# Patient Record
Sex: FEMALE | Race: WHITE | NOT HISPANIC OR LATINO | Employment: OTHER | URBAN - NONMETROPOLITAN AREA
[De-identification: names, ages, dates, MRNs, and addresses within clinical notes are randomized per-mention and may not be internally consistent; named-entity substitution may affect disease eponyms.]

---

## 2023-02-13 PROBLEM — G47.00 INSOMNIA: Status: ACTIVE | Noted: 2023-02-13

## 2023-02-13 PROBLEM — E83.42 HYPOMAGNESEMIA: Status: ACTIVE | Noted: 2023-02-13

## 2023-02-13 PROBLEM — E55.9 VITAMIN D DEFICIENCY: Status: ACTIVE | Noted: 2023-02-13

## 2023-02-13 PROBLEM — E11.40 DIABETIC NEUROPATHY (MULTI): Status: ACTIVE | Noted: 2023-02-13

## 2023-02-13 PROBLEM — R26.89 BALANCE DISORDER: Status: ACTIVE | Noted: 2023-02-13

## 2023-02-13 PROBLEM — R07.9 CHEST PAIN: Status: ACTIVE | Noted: 2023-02-13

## 2023-02-13 PROBLEM — M54.50 LOW BACK PAIN: Status: ACTIVE | Noted: 2023-02-13

## 2023-02-13 PROBLEM — H25.9 AGE-RELATED CATARACT OF BOTH EYES: Status: ACTIVE | Noted: 2023-02-13

## 2023-02-13 PROBLEM — B35.1 ONYCHOMYCOSIS: Status: ACTIVE | Noted: 2023-02-13

## 2023-02-13 PROBLEM — M17.9 OSTEOARTHRITIS OF KNEE: Status: ACTIVE | Noted: 2023-02-13

## 2023-02-13 PROBLEM — E11.43 DIABETIC AUTONOMIC NEUROPATHY ASSOCIATED WITH TYPE 2 DIABETES MELLITUS (MULTI): Status: ACTIVE | Noted: 2023-02-13

## 2023-02-13 PROBLEM — H81.10 BENIGN PAROXYSMAL POSITIONAL VERTIGO: Status: ACTIVE | Noted: 2023-02-13

## 2023-02-13 PROBLEM — S39.012A STRAIN OF LUMBAR REGION: Status: ACTIVE | Noted: 2023-02-13

## 2023-02-13 PROBLEM — I83.93 VARICOSE VEINS OF LEGS: Status: ACTIVE | Noted: 2023-02-13

## 2023-02-13 PROBLEM — K21.9 GERD (GASTROESOPHAGEAL REFLUX DISEASE): Status: ACTIVE | Noted: 2023-02-13

## 2023-02-13 PROBLEM — J06.9 UPPER RESPIRATORY INFECTION, ACUTE: Status: ACTIVE | Noted: 2023-02-13

## 2023-02-13 PROBLEM — E11.9 DIABETES MELLITUS, TYPE 2 (MULTI): Status: ACTIVE | Noted: 2023-02-13

## 2023-02-13 PROBLEM — I10 HTN (HYPERTENSION): Status: ACTIVE | Noted: 2023-02-13

## 2023-02-13 PROBLEM — N18.31 STAGE 3A CHRONIC KIDNEY DISEASE (MULTI): Status: ACTIVE | Noted: 2023-02-13

## 2023-02-13 PROBLEM — K59.00 CONSTIPATION: Status: ACTIVE | Noted: 2023-02-13

## 2023-02-13 PROBLEM — E11.21 CONTROLLED TYPE 2 DIABETES MELLITUS WITH DIABETIC NEPHROPATHY, WITHOUT LONG-TERM CURRENT USE OF INSULIN (MULTI): Status: ACTIVE | Noted: 2023-02-13

## 2023-02-13 PROBLEM — U07.1 COVID-19 VIRUS INFECTION: Status: ACTIVE | Noted: 2023-02-13

## 2023-02-13 PROBLEM — I48.91 ATRIAL FIBRILLATION (MULTI): Status: ACTIVE | Noted: 2023-02-13

## 2023-02-13 PROBLEM — E78.00 HYPERCHOLESTEROLEMIA: Status: ACTIVE | Noted: 2023-02-13

## 2023-02-13 PROBLEM — J45.909 ASTHMA (HHS-HCC): Status: ACTIVE | Noted: 2023-02-13

## 2023-02-13 RX ORDER — MONTELUKAST SODIUM 10 MG/1
10 TABLET ORAL NIGHTLY
COMMUNITY
Start: 2020-03-07 | End: 2023-07-14 | Stop reason: SDUPTHER

## 2023-02-13 RX ORDER — GENTAMICIN SULFATE 3 MG/ML
1 SOLUTION/ DROPS OPHTHALMIC 4 TIMES DAILY
COMMUNITY
Start: 2022-04-25

## 2023-02-13 RX ORDER — LISINOPRIL 20 MG/1
20 TABLET ORAL DAILY
COMMUNITY
Start: 2020-03-07 | End: 2023-11-06 | Stop reason: SDUPTHER

## 2023-02-13 RX ORDER — AMLODIPINE BESYLATE 10 MG/1
10 TABLET ORAL
COMMUNITY
Start: 2020-03-07 | End: 2023-11-06 | Stop reason: SDUPTHER

## 2023-02-13 RX ORDER — ALBUTEROL SULFATE 90 UG/1
2 AEROSOL, METERED RESPIRATORY (INHALATION) EVERY 6 HOURS PRN
COMMUNITY
Start: 2020-03-07 | End: 2024-05-06 | Stop reason: SDUPTHER

## 2023-02-13 RX ORDER — NITROGLYCERIN 0.4 MG/1
TABLET SUBLINGUAL
COMMUNITY
Start: 2022-09-13

## 2023-02-13 RX ORDER — CYANOCOBALAMIN (VITAMIN B-12) 500 MCG
1 TABLET ORAL DAILY
COMMUNITY
Start: 2021-09-02 | End: 2023-05-04 | Stop reason: ALTCHOICE

## 2023-02-13 RX ORDER — CALCIUM CARBONATE/VITAMIN D3 600MG-5MCG
1 TABLET ORAL 2 TIMES DAILY
COMMUNITY
Start: 2020-03-07 | End: 2023-05-04 | Stop reason: ALTCHOICE

## 2023-02-13 RX ORDER — HYDROGEN PEROXIDE 3 %
20 SOLUTION, NON-ORAL MISCELLANEOUS DAILY
COMMUNITY
Start: 2020-03-07

## 2023-02-13 RX ORDER — GABAPENTIN 600 MG/1
600 TABLET ORAL 3 TIMES DAILY
COMMUNITY
Start: 2021-09-10 | End: 2023-11-06 | Stop reason: SDUPTHER

## 2023-02-13 RX ORDER — METFORMIN HYDROCHLORIDE 500 MG/1
500 TABLET, EXTENDED RELEASE ORAL 2 TIMES DAILY
COMMUNITY
Start: 2022-09-13 | End: 2023-11-06 | Stop reason: SDUPTHER

## 2023-02-13 RX ORDER — AMOXICILLIN 250 MG
1 CAPSULE ORAL 2 TIMES DAILY
COMMUNITY
Start: 2021-06-11 | End: 2023-05-04 | Stop reason: ALTCHOICE

## 2023-02-13 RX ORDER — ONDANSETRON 4 MG/1
4 TABLET, ORALLY DISINTEGRATING ORAL EVERY 6 HOURS
COMMUNITY
Start: 2021-09-02 | End: 2023-05-04 | Stop reason: ALTCHOICE

## 2023-02-13 RX ORDER — LANOLIN ALCOHOL/MO/W.PET/CERES
1 CREAM (GRAM) TOPICAL DAILY
COMMUNITY
Start: 2020-03-07

## 2023-02-13 RX ORDER — METOPROLOL TARTRATE 75 MG/1
75 TABLET, FILM COATED ORAL 2 TIMES DAILY
COMMUNITY
Start: 2022-03-01 | End: 2023-07-14 | Stop reason: SDUPTHER

## 2023-02-13 RX ORDER — TALC
3 POWDER (GRAM) TOPICAL NIGHTLY
COMMUNITY
Start: 2020-03-07

## 2023-02-13 RX ORDER — SENNOSIDES/DOCUSATE SODIUM 8.6MG-50MG
TABLET ORAL
COMMUNITY
Start: 2020-03-07

## 2023-02-13 RX ORDER — PROPAFENONE HYDROCHLORIDE 225 MG/1
225 TABLET, COATED ORAL 2 TIMES DAILY
COMMUNITY
Start: 2020-08-18 | End: 2023-06-30 | Stop reason: ALTCHOICE

## 2023-02-13 RX ORDER — ATORVASTATIN CALCIUM 40 MG/1
40 TABLET, FILM COATED ORAL NIGHTLY
COMMUNITY
Start: 2021-09-02 | End: 2023-07-14 | Stop reason: SDUPTHER

## 2023-02-13 RX ORDER — DEXTROMETHORPHAN HYDROBROMIDE, GUAIFENESIN 5; 100 MG/5ML; MG/5ML
1300 LIQUID ORAL
COMMUNITY
Start: 2020-03-07 | End: 2023-05-04 | Stop reason: ALTCHOICE

## 2023-03-13 ENCOUNTER — TELEPHONE (OUTPATIENT)
Dept: PRIMARY CARE | Facility: CLINIC | Age: 78
End: 2023-03-13
Payer: MEDICARE

## 2023-03-13 DIAGNOSIS — J06.9 VIRAL UPPER RESPIRATORY TRACT INFECTION: ICD-10-CM

## 2023-03-13 DIAGNOSIS — J06.9 VIRAL UPPER RESPIRATORY TRACT INFECTION: Primary | ICD-10-CM

## 2023-03-13 DIAGNOSIS — J01.90 ACUTE SINUSITIS, RECURRENCE NOT SPECIFIED, UNSPECIFIED LOCATION: Primary | ICD-10-CM

## 2023-03-13 RX ORDER — AZITHROMYCIN 250 MG/1
TABLET, FILM COATED ORAL
Qty: 6 TABLET | Refills: 0 | Status: SHIPPED | OUTPATIENT
Start: 2023-03-13 | End: 2023-03-18

## 2023-03-13 RX ORDER — AZITHROMYCIN 250 MG/1
TABLET, FILM COATED ORAL
Qty: 6 TABLET | Refills: 0 | Status: SHIPPED | OUTPATIENT
Start: 2023-03-13 | End: 2023-03-13 | Stop reason: SDUPTHER

## 2023-03-13 NOTE — TELEPHONE ENCOUNTER
PC Needs a prescription for a zpack, has sore throat & cold sx for several day & has been exposed to strep throat. Please call if needed.  Pharmacy

## 2023-04-06 ENCOUNTER — APPOINTMENT (OUTPATIENT)
Dept: PRIMARY CARE | Facility: CLINIC | Age: 78
End: 2023-04-06
Payer: MEDICARE

## 2023-04-20 LAB
ALANINE AMINOTRANSFERASE (SGPT) (U/L) IN SER/PLAS: 10 U/L (ref 7–45)
ALBUMIN (G/DL) IN SER/PLAS: 4.3 G/DL (ref 3.4–5)
ALKALINE PHOSPHATASE (U/L) IN SER/PLAS: 77 U/L (ref 33–136)
ANION GAP IN SER/PLAS: 12 MMOL/L (ref 10–20)
ASPARTATE AMINOTRANSFERASE (SGOT) (U/L) IN SER/PLAS: 13 U/L (ref 9–39)
BASOPHILS (10*3/UL) IN BLOOD BY AUTOMATED COUNT: 0.09 X10E9/L (ref 0–0.1)
BASOPHILS/100 LEUKOCYTES IN BLOOD BY AUTOMATED COUNT: 1.1 % (ref 0–2)
BILIRUBIN TOTAL (MG/DL) IN SER/PLAS: 0.3 MG/DL (ref 0–1.2)
CALCIDIOL (25 OH VITAMIN D3) (NG/ML) IN SER/PLAS: 21 NG/ML
CALCIUM (MG/DL) IN SER/PLAS: 9.4 MG/DL (ref 8.6–10.3)
CARBON DIOXIDE, TOTAL (MMOL/L) IN SER/PLAS: 27 MMOL/L (ref 21–32)
CHLORIDE (MMOL/L) IN SER/PLAS: 104 MMOL/L (ref 98–107)
CHOLESTEROL (MG/DL) IN SER/PLAS: 155 MG/DL (ref 0–199)
CHOLESTEROL IN HDL (MG/DL) IN SER/PLAS: 46 MG/DL
CHOLESTEROL/HDL RATIO: 3.4
COBALAMIN (VITAMIN B12) (PG/ML) IN SER/PLAS: 252 PG/ML (ref 211–911)
CREATININE (MG/DL) IN SER/PLAS: 1.05 MG/DL (ref 0.5–1.05)
ERYTHROCYTE DISTRIBUTION WIDTH (RATIO) BY AUTOMATED COUNT: 13.9 % (ref 11.5–14.5)
ERYTHROCYTE MEAN CORPUSCULAR HEMOGLOBIN CONCENTRATION (G/DL) BY AUTOMATED: 29.8 G/DL (ref 32–36)
ERYTHROCYTE MEAN CORPUSCULAR VOLUME (FL) BY AUTOMATED COUNT: 81 FL (ref 80–100)
ERYTHROCYTES (10*6/UL) IN BLOOD BY AUTOMATED COUNT: 4.97 X10E12/L (ref 4–5.2)
ESTIMATED AVERAGE GLUCOSE FOR HBA1C: 171 MG/DL
GFR FEMALE: 54 ML/MIN/1.73M2
GLUCOSE (MG/DL) IN SER/PLAS: 147 MG/DL (ref 74–99)
HEMATOCRIT (%) IN BLOOD BY AUTOMATED COUNT: 40 % (ref 36–46)
HEMOGLOBIN (G/DL) IN BLOOD: 11.9 G/DL (ref 12–16)
HEMOGLOBIN A1C/HEMOGLOBIN TOTAL IN BLOOD: 7.6 %
IMMATURE GRANULOCYTES/100 LEUKOCYTES IN BLOOD BY AUTOMATED COUNT: 0.2 % (ref 0–0.9)
LDL: 81 MG/DL (ref 0–99)
LEUKOCYTES (10*3/UL) IN BLOOD BY AUTOMATED COUNT: 8.3 X10E9/L (ref 4.4–11.3)
LYMPHOCYTES (10*3/UL) IN BLOOD BY AUTOMATED COUNT: 2.97 X10E9/L (ref 0.8–3)
LYMPHOCYTES/100 LEUKOCYTES IN BLOOD BY AUTOMATED COUNT: 35.9 % (ref 13–44)
MAGNESIUM (MG/DL) IN SER/PLAS: 1.74 MG/DL (ref 1.6–2.4)
MONOCYTES (10*3/UL) IN BLOOD BY AUTOMATED COUNT: 0.68 X10E9/L (ref 0.05–0.8)
MONOCYTES/100 LEUKOCYTES IN BLOOD BY AUTOMATED COUNT: 8.2 % (ref 2–10)
NEUTROPHILS (10*3/UL) IN BLOOD BY AUTOMATED COUNT: 4.51 X10E9/L (ref 1.6–5.5)
NEUTROPHILS/100 LEUKOCYTES IN BLOOD BY AUTOMATED COUNT: 54.6 % (ref 40–80)
PLATELETS (10*3/UL) IN BLOOD AUTOMATED COUNT: 285 X10E9/L (ref 150–450)
POTASSIUM (MMOL/L) IN SER/PLAS: 4.2 MMOL/L (ref 3.5–5.3)
PROTEIN TOTAL: 7.4 G/DL (ref 6.4–8.2)
SODIUM (MMOL/L) IN SER/PLAS: 139 MMOL/L (ref 136–145)
TRIGLYCERIDE (MG/DL) IN SER/PLAS: 141 MG/DL (ref 0–149)
UREA NITROGEN (MG/DL) IN SER/PLAS: 16 MG/DL (ref 6–23)
VLDL: 28 MG/DL (ref 0–40)

## 2023-05-04 ENCOUNTER — OFFICE VISIT (OUTPATIENT)
Dept: PRIMARY CARE | Facility: CLINIC | Age: 78
End: 2023-05-04
Payer: MEDICARE

## 2023-05-04 VITALS
OXYGEN SATURATION: 94 % | BODY MASS INDEX: 31.34 KG/M2 | WEIGHT: 166 LBS | DIASTOLIC BLOOD PRESSURE: 74 MMHG | HEIGHT: 61 IN | SYSTOLIC BLOOD PRESSURE: 128 MMHG | HEART RATE: 90 BPM

## 2023-05-04 DIAGNOSIS — F51.04 PSYCHOPHYSIOLOGICAL INSOMNIA: ICD-10-CM

## 2023-05-04 DIAGNOSIS — E55.9 VITAMIN D DEFICIENCY: ICD-10-CM

## 2023-05-04 DIAGNOSIS — M17.12 PRIMARY OSTEOARTHRITIS OF LEFT KNEE: ICD-10-CM

## 2023-05-04 DIAGNOSIS — B35.1 ONYCHOMYCOSIS: ICD-10-CM

## 2023-05-04 DIAGNOSIS — J20.9 ACUTE BRONCHITIS, UNSPECIFIED ORGANISM: ICD-10-CM

## 2023-05-04 DIAGNOSIS — E83.42 HYPOMAGNESEMIA: ICD-10-CM

## 2023-05-04 DIAGNOSIS — E78.00 HYPERCHOLESTEROLEMIA: ICD-10-CM

## 2023-05-04 DIAGNOSIS — E11.21 CONTROLLED TYPE 2 DIABETES MELLITUS WITH DIABETIC NEPHROPATHY, WITHOUT LONG-TERM CURRENT USE OF INSULIN (MULTI): ICD-10-CM

## 2023-05-04 DIAGNOSIS — H25.9 AGE-RELATED CATARACT OF BOTH EYES, UNSPECIFIED AGE-RELATED CATARACT TYPE: ICD-10-CM

## 2023-05-04 DIAGNOSIS — K21.9 GASTROESOPHAGEAL REFLUX DISEASE WITHOUT ESOPHAGITIS: ICD-10-CM

## 2023-05-04 DIAGNOSIS — G89.29 CHRONIC MIDLINE LOW BACK PAIN WITHOUT SCIATICA: ICD-10-CM

## 2023-05-04 DIAGNOSIS — M54.50 CHRONIC MIDLINE LOW BACK PAIN WITHOUT SCIATICA: ICD-10-CM

## 2023-05-04 DIAGNOSIS — E11.51 DIABETES MELLITUS TYPE 2 WITH PERIPHERAL ARTERY DISEASE (MULTI): ICD-10-CM

## 2023-05-04 DIAGNOSIS — E11.43 DIABETIC AUTONOMIC NEUROPATHY ASSOCIATED WITH TYPE 2 DIABETES MELLITUS (MULTI): ICD-10-CM

## 2023-05-04 DIAGNOSIS — Z00.00 ROUTINE GENERAL MEDICAL EXAMINATION AT HEALTH CARE FACILITY: Primary | ICD-10-CM

## 2023-05-04 DIAGNOSIS — I48.0 PAROXYSMAL ATRIAL FIBRILLATION (MULTI): ICD-10-CM

## 2023-05-04 DIAGNOSIS — E11.21 DIABETIC NEPHROPATHY ASSOCIATED WITH TYPE 2 DIABETES MELLITUS (MULTI): ICD-10-CM

## 2023-05-04 DIAGNOSIS — I10 PRIMARY HYPERTENSION: ICD-10-CM

## 2023-05-04 PROBLEM — H81.10 BENIGN PAROXYSMAL POSITIONAL VERTIGO: Status: RESOLVED | Noted: 2023-02-13 | Resolved: 2023-05-04

## 2023-05-04 PROBLEM — E08.42 DIABETIC POLYNEUROPATHY ASSOCIATED WITH DIABETES MELLITUS DUE TO UNDERLYING CONDITION (MULTI): Status: ACTIVE | Noted: 2021-07-01

## 2023-05-04 PROBLEM — S39.012A STRAIN OF LUMBAR REGION: Status: RESOLVED | Noted: 2023-02-13 | Resolved: 2023-05-04

## 2023-05-04 PROBLEM — J06.9 UPPER RESPIRATORY INFECTION, ACUTE: Status: RESOLVED | Noted: 2023-02-13 | Resolved: 2023-05-04

## 2023-05-04 PROBLEM — U07.1 COVID-19 VIRUS INFECTION: Status: RESOLVED | Noted: 2023-02-13 | Resolved: 2023-05-04

## 2023-05-04 PROBLEM — E11.9 DIABETES MELLITUS, TYPE 2 (MULTI): Status: RESOLVED | Noted: 2023-02-13 | Resolved: 2023-05-04

## 2023-05-04 PROBLEM — E11.40 DIABETIC NEUROPATHY (MULTI): Status: RESOLVED | Noted: 2023-02-13 | Resolved: 2023-05-04

## 2023-05-04 PROBLEM — E08.42 DIABETIC POLYNEUROPATHY ASSOCIATED WITH DIABETES MELLITUS DUE TO UNDERLYING CONDITION (MULTI): Status: RESOLVED | Noted: 2021-07-01 | Resolved: 2023-05-04

## 2023-05-04 PROBLEM — K59.00 CONSTIPATION: Status: RESOLVED | Noted: 2023-02-13 | Resolved: 2023-05-04

## 2023-05-04 PROBLEM — R07.9 CHEST PAIN: Status: RESOLVED | Noted: 2023-02-13 | Resolved: 2023-05-04

## 2023-05-04 PROBLEM — I83.93 VARICOSE VEINS OF LEGS: Status: RESOLVED | Noted: 2023-02-13 | Resolved: 2023-05-04

## 2023-05-04 PROBLEM — R26.89 BALANCE DISORDER: Status: RESOLVED | Noted: 2023-02-13 | Resolved: 2023-05-04

## 2023-05-04 PROCEDURE — G0439 PPPS, SUBSEQ VISIT: HCPCS | Performed by: FAMILY MEDICINE

## 2023-05-04 PROCEDURE — 1159F MED LIST DOCD IN RCRD: CPT | Performed by: FAMILY MEDICINE

## 2023-05-04 PROCEDURE — 3078F DIAST BP <80 MM HG: CPT | Performed by: FAMILY MEDICINE

## 2023-05-04 PROCEDURE — 1036F TOBACCO NON-USER: CPT | Performed by: FAMILY MEDICINE

## 2023-05-04 PROCEDURE — 1160F RVW MEDS BY RX/DR IN RCRD: CPT | Performed by: FAMILY MEDICINE

## 2023-05-04 PROCEDURE — 99214 OFFICE O/P EST MOD 30 MIN: CPT | Performed by: FAMILY MEDICINE

## 2023-05-04 PROCEDURE — 1170F FXNL STATUS ASSESSED: CPT | Performed by: FAMILY MEDICINE

## 2023-05-04 PROCEDURE — 3074F SYST BP LT 130 MM HG: CPT | Performed by: FAMILY MEDICINE

## 2023-05-04 RX ORDER — ACETAMINOPHEN 325 MG/1
325 TABLET ORAL EVERY 4 HOURS PRN
COMMUNITY

## 2023-05-04 RX ORDER — AZITHROMYCIN 250 MG/1
TABLET, FILM COATED ORAL
Qty: 6 TABLET | Refills: 0 | Status: SHIPPED | OUTPATIENT
Start: 2023-05-04 | End: 2023-05-09

## 2023-05-04 RX ORDER — DIPHENHYDRAMINE HCL 25 MG
1 TABLET ORAL DAILY
COMMUNITY
Start: 2022-09-13

## 2023-05-04 RX ORDER — ADHESIVE BANDAGE 7/8"
2 BANDAGE TOPICAL DAILY
COMMUNITY

## 2023-05-04 ASSESSMENT — PATIENT HEALTH QUESTIONNAIRE - PHQ9
SUM OF ALL RESPONSES TO PHQ9 QUESTIONS 1 AND 2: 2
2. FEELING DOWN, DEPRESSED OR HOPELESS: SEVERAL DAYS
1. LITTLE INTEREST OR PLEASURE IN DOING THINGS: SEVERAL DAYS
2. FEELING DOWN, DEPRESSED OR HOPELESS: SEVERAL DAYS
1. LITTLE INTEREST OR PLEASURE IN DOING THINGS: SEVERAL DAYS
SUM OF ALL RESPONSES TO PHQ9 QUESTIONS 1 AND 2: 2

## 2023-05-04 ASSESSMENT — ACTIVITIES OF DAILY LIVING (ADL)
DRESSING: INDEPENDENT
TAKING_MEDICATION: INDEPENDENT
DOING_HOUSEWORK: NEEDS ASSISTANCE
GROCERY_SHOPPING: INDEPENDENT
MANAGING_FINANCES: INDEPENDENT
BATHING: INDEPENDENT

## 2023-05-04 NOTE — PROGRESS NOTES
Subjective   Reason for Visit: Tish Linton is an 77 y.o. female here for a Medicare Wellness visit.     Past Medical, Surgical, and Family History reviewed and updated in chart.    Reviewed all medications by prescribing practitioner or clinical pharmacist (such as prescriptions, OTCs, herbal therapies and supplements) and documented in the medical record.    HPI  Cataracts - No issues but needs eyes checks.      Asthma - has been good. More of the inhaler. Had COVID-19 in 2021 and 2022  and still some THOMAS but much better.. Around little children so gets more.     Diabetes - A1C 7.6%  . Home glucose running in low to mid 100s. No low sugars. No Chest pain, Dyspnea, palpitations, numbness, weakness, edema, claudication, or double vision/ loss of vision.     Diabetic neuropathy - burning in feet. Gabapentin increase did help. Decrease 50% with gabapentin      Diabetic autonomic neuropathy - some leaking but fiber helps. Stomach empties. Gets lightheaded when standing up at times. Some when laying down. Encouraged fluids. Also gets some vertigo when she lays down and meclizine helps. Covid encephalopathy resolved      GERD - No HB, Melena, dysphagia, or hematochezia. Daily PPI Nexium 20 daily. Will take a second if high acid food     HTN - has been good at home 130s and 140s/80s     Hypercholesteremia - on Lipitor and doing well      Insomnia - back on Melatonin and not much help.      Atrial fibrillation - feels like she is doing well. Dr Coley. Eliquis.   On propafenone. No racing or fluttering.      Low Back pain bothers her some if over lifting      Onychomycosis - needs trimmed due to thickness. Dr Guzman      Osteoarthritis - Injects at times in knee. Considering TKR.  Dr Neri     PAD - No sores on feet now. She amputated one on left.      CKD 3 - No blood GFR 54.      Vitamin D - on Vitamin D with Multi. Will check to be sure 1000 Units.  21 this time.      Cologuard 8/26/20.. Now age 77.   Mammogram  "9/2/20.   DEXA 9/2/20 FRAX 16/2.4  LW and DPA - daughter Pushpa.   Pneumovax UTD.    In process Shingrix.        Patient Care Team:  Cy Walters MD as PCP - General  Cy Walters MD as PCP - [employer] PCP     Review of Systems    Objective   Vitals:  /74 (BP Location: Left arm, Patient Position: Sitting)   Pulse 90   Ht 1.556 m (5' 1.25\")   Wt 75.3 kg (166 lb)   SpO2 94%   BMI 31.11 kg/m²       Physical Exam  Vitals reviewed.   Constitutional:       General: She is not in acute distress.     Appearance: Normal appearance. She is obese.   HENT:      Head: Normocephalic.      Right Ear: Tympanic membrane, ear canal and external ear normal.      Left Ear: Tympanic membrane, ear canal and external ear normal.      Nose: Nose normal.      Mouth/Throat:      Pharynx: Oropharynx is clear.   Eyes:      Extraocular Movements: Extraocular movements intact.      Conjunctiva/sclera: Conjunctivae normal.      Pupils: Pupils are equal, round, and reactive to light.   Neck:      Vascular: No carotid bruit.   Cardiovascular:      Rate and Rhythm: Normal rate and regular rhythm.      Pulses: Normal pulses.      Heart sounds: Normal heart sounds. No murmur heard.  Pulmonary:      Effort: Pulmonary effort is normal. No respiratory distress.      Breath sounds: Examination of the left-lower field reveals rhonchi. Rhonchi present.   Abdominal:      General: Abdomen is flat. Bowel sounds are normal. There is no distension.      Palpations: Abdomen is soft. There is no mass.      Tenderness: There is no abdominal tenderness.   Musculoskeletal:      Cervical back: Normal range of motion and neck supple. No tenderness.      Right knee: Normal range of motion. No LCL laxity or MCL laxity.      Left knee: Normal range of motion. Tenderness present over the medial joint line. MCL laxity present. No LCL laxity.  Lymphadenopathy:      Cervical: No cervical adenopathy.   Skin:     General: Skin is warm and dry.      " Findings: No rash.   Neurological:      General: No focal deficit present.      Mental Status: She is alert and oriented to person, place, and time.   Psychiatric:         Mood and Affect: Mood normal.         Thought Content: Thought content normal.         Judgment: Judgment normal.         Assessment/Plan   Problem List Items Addressed This Visit       Age-related cataract of both eyes    Atrial fibrillation (CMS/HCC)    Diabetes mellitus type 2 with peripheral artery disease (CMS/HCC)    Diabetic autonomic neuropathy associated with type 2 diabetes mellitus (CMS/HCC)    Diabetic nephropathy associated with type 2 diabetes mellitus (CMS/HCC)    Relevant Orders    CBC    GERD (gastroesophageal reflux disease)    HTN (hypertension)    Hypercholesterolemia    Hypomagnesemia    Insomnia    Low back pain    Onychomycosis    Osteoarthritis of knee    Vitamin D deficiency    Relevant Orders    Vitamin D 1,25 Dihydroxy     Other Visit Diagnoses       Routine general medical examination at health care facility    -  Primary    Acute bronchitis, unspecified organism        Relevant Medications    azithromycin (Zithromax) 250 mg tablet             Patient was identified as a fall risk. Risk prevention instructions provided.  She fell when she got up too quick. She is more careful at hs

## 2023-05-04 NOTE — PATIENT INSTRUCTIONS

## 2023-05-16 ENCOUNTER — TELEPHONE (OUTPATIENT)
Dept: PRIMARY CARE | Facility: CLINIC | Age: 78
End: 2023-05-16
Payer: MEDICARE

## 2023-06-30 ENCOUNTER — OFFICE VISIT (OUTPATIENT)
Dept: PRIMARY CARE | Facility: CLINIC | Age: 78
End: 2023-06-30
Payer: MEDICARE

## 2023-06-30 VITALS
HEART RATE: 91 BPM | WEIGHT: 168 LBS | DIASTOLIC BLOOD PRESSURE: 64 MMHG | SYSTOLIC BLOOD PRESSURE: 102 MMHG | BODY MASS INDEX: 31.48 KG/M2 | OXYGEN SATURATION: 95 %

## 2023-06-30 DIAGNOSIS — E11.21 DIABETIC NEPHROPATHY ASSOCIATED WITH TYPE 2 DIABETES MELLITUS (MULTI): ICD-10-CM

## 2023-06-30 DIAGNOSIS — D17.23 LIPOMA OF RIGHT THIGH: Primary | ICD-10-CM

## 2023-06-30 DIAGNOSIS — I48.0 PAROXYSMAL ATRIAL FIBRILLATION (MULTI): ICD-10-CM

## 2023-06-30 DIAGNOSIS — G57.11 MERALGIA PARESTHETICA, RIGHT: ICD-10-CM

## 2023-06-30 PROCEDURE — 3074F SYST BP LT 130 MM HG: CPT | Performed by: FAMILY MEDICINE

## 2023-06-30 PROCEDURE — 99214 OFFICE O/P EST MOD 30 MIN: CPT | Performed by: FAMILY MEDICINE

## 2023-06-30 PROCEDURE — 3078F DIAST BP <80 MM HG: CPT | Performed by: FAMILY MEDICINE

## 2023-06-30 PROCEDURE — 1160F RVW MEDS BY RX/DR IN RCRD: CPT | Performed by: FAMILY MEDICINE

## 2023-06-30 PROCEDURE — 1159F MED LIST DOCD IN RCRD: CPT | Performed by: FAMILY MEDICINE

## 2023-06-30 PROCEDURE — 1036F TOBACCO NON-USER: CPT | Performed by: FAMILY MEDICINE

## 2023-06-30 NOTE — PROGRESS NOTES
"Subjective   Patient ID: Tish Linton is a 78 y.o. female who presents for evaluate (Leg numbness when she touches it, know on right thigh).    HPI     Has had right leg numbness for two months, rarely pain. Has used tylenol without effect. Numbness is constant, no change over time. Noticed a \"knot\"on medial thigh 1 week ago. I palpated a soft mobile lipoma to the right medial thigh. Both thighs have the same circumference when measured. Right anterior and lateral thigh has reduced sensation per her report on exam. Posterior thigh sensation and lower leg sensation are normal. No discoloration. She does have a history of neuropathy in her feet, takes Gabapentin. This feels different to her, more of a burning sensation. Sugars at home 150-170. Has been holding metformin occasionally when  having diarrhea.     Is also curious about stopping her Propafenone, her cardiologist has told her she doesn't need it. Note from last cardiology visit:    Tish Linton is a 76 y.o. woman with history of paroxysmal atrial fibrillation, diabetes, peripheral vascular disease, hypertension, hyperlipidemia and GERD who presents the clinic for follow-up. She previously followed with Dr. Thompson.     Paroxysmal atrial [id number] 5. Patient reports that she has been on propafenone since she was diagnosed with atrial fibrillation. She reports that she was asymptomatic at that time. Given the potential for proarrhythmic events with propafenone given that the patient was asymptomatic when he was initiated we did discuss stopping the medication for now and seeing how she tolerates this. She is agreeable to this. The patient has been taking metoprolol 50 mg twice daily which we will increase to 75 mg twice daily. She will continue Eliquis. Lab work from  reviewed.     Please stop your Rythmol and increase your metoprolol to 75 mg.     Provider:  Ashlee Coley MD     She is still taking it, just down to 1 pill daily " for the last week. Her metoprolol was already at 75mg BID.        Review of Systems    Objective   /64 (BP Location: Left arm, Patient Position: Sitting)   Pulse 91   Wt 76.2 kg (168 lb)   SpO2 95%   BMI 31.48 kg/m²     Physical Exam  Constitutional:       Appearance: Normal appearance.   HENT:      Head: Normocephalic.      Right Ear: External ear normal.      Left Ear: External ear normal.      Nose: Nose normal.      Mouth/Throat:      Mouth: Mucous membranes are moist.      Pharynx: Oropharynx is clear.   Eyes:      Pupils: Pupils are equal, round, and reactive to light.   Cardiovascular:      Rate and Rhythm: Normal rate and regular rhythm.      Pulses: Normal pulses.      Heart sounds: Normal heart sounds.   Pulmonary:      Effort: Pulmonary effort is normal.      Breath sounds: Normal breath sounds.   Musculoskeletal:      Right lower leg: No edema.      Left lower leg: No edema.   Skin:     General: Skin is warm and dry.      Findings: Lesion (marbeled sized subQ lesion inner right thigh. Not tender.) present.   Neurological:      General: No focal deficit present.      Mental Status: She is alert and oriented to person, place, and time.      Sensory: Sensory deficit (altered lateral thigh. Right) present.   Psychiatric:         Mood and Affect: Mood normal.         Behavior: Behavior normal.         Thought Content: Thought content normal.         Judgment: Judgment normal.         Assessment/Plan   Problem List Items Addressed This Visit       Atrial fibrillation (CMS/HCC)    Diabetic nephropathy associated with type 2 diabetes mellitus (CMS/HCC)    Lipoma of right thigh - Primary    Meralgia paresthetica, right   Reassured regarding latter. Can take Tylenol if uncomfortable.. Weight loss may help.    She can stop the propafenone. Keep metoprolol the same since BP is low.  If she notes palpitations, can consider increase   Patient initially seen by Rudy Rush NP student. I reviewed history and  examined patient independently and updated as needed.

## 2023-07-14 ENCOUNTER — TELEPHONE (OUTPATIENT)
Dept: PRIMARY CARE | Facility: CLINIC | Age: 78
End: 2023-07-14
Payer: MEDICARE

## 2023-07-14 DIAGNOSIS — I48.0 PAROXYSMAL ATRIAL FIBRILLATION (MULTI): ICD-10-CM

## 2023-07-14 DIAGNOSIS — J45.909 ASTHMA, UNSPECIFIED ASTHMA SEVERITY, UNSPECIFIED WHETHER COMPLICATED, UNSPECIFIED WHETHER PERSISTENT (HHS-HCC): ICD-10-CM

## 2023-07-14 DIAGNOSIS — E78.00 HYPERCHOLESTEROLEMIA: ICD-10-CM

## 2023-07-14 RX ORDER — METOPROLOL TARTRATE 75 MG/1
75 TABLET, FILM COATED ORAL 2 TIMES DAILY
Qty: 180 TABLET | Refills: 3 | Status: SHIPPED | OUTPATIENT
Start: 2023-07-14 | End: 2024-07-13

## 2023-07-14 RX ORDER — ATORVASTATIN CALCIUM 40 MG/1
40 TABLET, FILM COATED ORAL NIGHTLY
Qty: 90 TABLET | Refills: 3 | Status: SHIPPED | OUTPATIENT
Start: 2023-07-14 | End: 2024-07-13

## 2023-07-14 RX ORDER — MONTELUKAST SODIUM 10 MG/1
10 TABLET ORAL NIGHTLY
Qty: 90 TABLET | Refills: 3 | Status: SHIPPED | OUTPATIENT
Start: 2023-07-14 | End: 2024-07-13

## 2023-11-02 ENCOUNTER — LAB (OUTPATIENT)
Dept: LAB | Facility: LAB | Age: 78
End: 2023-11-02
Payer: MEDICARE

## 2023-11-02 DIAGNOSIS — E11.21 CONTROLLED TYPE 2 DIABETES MELLITUS WITH DIABETIC NEPHROPATHY, WITHOUT LONG-TERM CURRENT USE OF INSULIN (MULTI): ICD-10-CM

## 2023-11-02 DIAGNOSIS — E55.9 VITAMIN D DEFICIENCY: ICD-10-CM

## 2023-11-02 DIAGNOSIS — E11.21 DIABETIC NEPHROPATHY ASSOCIATED WITH TYPE 2 DIABETES MELLITUS (MULTI): ICD-10-CM

## 2023-11-02 LAB
ALBUMIN SERPL BCP-MCNC: 4.4 G/DL (ref 3.4–5)
ALP SERPL-CCNC: 69 U/L (ref 33–136)
ALT SERPL W P-5'-P-CCNC: 14 U/L (ref 7–45)
ANION GAP SERPL CALC-SCNC: 13 MMOL/L (ref 10–20)
AST SERPL W P-5'-P-CCNC: 15 U/L (ref 9–39)
BILIRUB SERPL-MCNC: 0.5 MG/DL (ref 0–1.2)
BUN SERPL-MCNC: 18 MG/DL (ref 6–23)
CALCIUM SERPL-MCNC: 9.5 MG/DL (ref 8.6–10.3)
CHLORIDE SERPL-SCNC: 102 MMOL/L (ref 98–107)
CO2 SERPL-SCNC: 25 MMOL/L (ref 21–32)
CREAT SERPL-MCNC: 1.05 MG/DL (ref 0.5–1.05)
ERYTHROCYTE [DISTWIDTH] IN BLOOD BY AUTOMATED COUNT: 14.6 % (ref 11.5–14.5)
EST. AVERAGE GLUCOSE BLD GHB EST-MCNC: 183 MG/DL
GFR SERPL CREATININE-BSD FRML MDRD: 54 ML/MIN/1.73M*2
GLUCOSE SERPL-MCNC: 130 MG/DL (ref 74–99)
HBA1C MFR BLD: 8 %
HCT VFR BLD AUTO: 41.2 % (ref 36–46)
HGB BLD-MCNC: 12.8 G/DL (ref 12–16)
MCH RBC QN AUTO: 25.1 PG (ref 26–34)
MCHC RBC AUTO-ENTMCNC: 31.1 G/DL (ref 32–36)
MCV RBC AUTO: 81 FL (ref 80–100)
NRBC BLD-RTO: 0 /100 WBCS (ref 0–0)
PLATELET # BLD AUTO: 318 X10*3/UL (ref 150–450)
POTASSIUM SERPL-SCNC: 4.2 MMOL/L (ref 3.5–5.3)
PROT SERPL-MCNC: 7.6 G/DL (ref 6.4–8.2)
RBC # BLD AUTO: 5.1 X10*6/UL (ref 4–5.2)
SODIUM SERPL-SCNC: 136 MMOL/L (ref 136–145)
WBC # BLD AUTO: 11.1 X10*3/UL (ref 4.4–11.3)

## 2023-11-02 PROCEDURE — 83036 HEMOGLOBIN GLYCOSYLATED A1C: CPT

## 2023-11-02 PROCEDURE — 36415 COLL VENOUS BLD VENIPUNCTURE: CPT

## 2023-11-02 PROCEDURE — 82652 VIT D 1 25-DIHYDROXY: CPT

## 2023-11-02 PROCEDURE — 80053 COMPREHEN METABOLIC PANEL: CPT

## 2023-11-02 PROCEDURE — 85027 COMPLETE CBC AUTOMATED: CPT

## 2023-11-05 LAB — 1,25(OH)2D3 SERPL-MCNC: 51.8 PG/ML (ref 19.9–79.3)

## 2023-11-06 ENCOUNTER — OFFICE VISIT (OUTPATIENT)
Dept: PRIMARY CARE | Facility: CLINIC | Age: 78
End: 2023-11-06
Payer: MEDICARE

## 2023-11-06 VITALS
DIASTOLIC BLOOD PRESSURE: 84 MMHG | WEIGHT: 162 LBS | BODY MASS INDEX: 30.36 KG/M2 | HEART RATE: 75 BPM | SYSTOLIC BLOOD PRESSURE: 122 MMHG | OXYGEN SATURATION: 99 %

## 2023-11-06 DIAGNOSIS — E11.51 DIABETES MELLITUS TYPE 2 WITH PERIPHERAL ARTERY DISEASE (MULTI): ICD-10-CM

## 2023-11-06 DIAGNOSIS — E55.9 VITAMIN D DEFICIENCY: ICD-10-CM

## 2023-11-06 DIAGNOSIS — G57.11 MERALGIA PARESTHETICA OF RIGHT SIDE: ICD-10-CM

## 2023-11-06 DIAGNOSIS — K21.9 GASTROESOPHAGEAL REFLUX DISEASE WITHOUT ESOPHAGITIS: ICD-10-CM

## 2023-11-06 DIAGNOSIS — I10 PRIMARY HYPERTENSION: ICD-10-CM

## 2023-11-06 DIAGNOSIS — E11.43 DIABETIC AUTONOMIC NEUROPATHY ASSOCIATED WITH TYPE 2 DIABETES MELLITUS (MULTI): ICD-10-CM

## 2023-11-06 DIAGNOSIS — B35.1 ONYCHOMYCOSIS: ICD-10-CM

## 2023-11-06 DIAGNOSIS — F51.04 PSYCHOPHYSIOLOGICAL INSOMNIA: ICD-10-CM

## 2023-11-06 DIAGNOSIS — E83.42 HYPOMAGNESEMIA: ICD-10-CM

## 2023-11-06 DIAGNOSIS — I48.0 PAROXYSMAL ATRIAL FIBRILLATION (MULTI): Primary | ICD-10-CM

## 2023-11-06 DIAGNOSIS — E66.09 CLASS 1 OBESITY DUE TO EXCESS CALORIES WITH SERIOUS COMORBIDITY AND BODY MASS INDEX (BMI) OF 30.0 TO 30.9 IN ADULT: ICD-10-CM

## 2023-11-06 DIAGNOSIS — R71.8 ABNORMAL RBC INDICES: ICD-10-CM

## 2023-11-06 DIAGNOSIS — H25.9 AGE-RELATED CATARACT OF BOTH EYES, UNSPECIFIED AGE-RELATED CATARACT TYPE: ICD-10-CM

## 2023-11-06 DIAGNOSIS — J45.20 MILD INTERMITTENT ASTHMA WITHOUT COMPLICATION (HHS-HCC): ICD-10-CM

## 2023-11-06 DIAGNOSIS — R25.2 LEG CRAMPS: ICD-10-CM

## 2023-11-06 DIAGNOSIS — D17.23 LIPOMA OF RIGHT THIGH: ICD-10-CM

## 2023-11-06 DIAGNOSIS — M17.12 PRIMARY OSTEOARTHRITIS OF LEFT KNEE: ICD-10-CM

## 2023-11-06 DIAGNOSIS — N18.31 STAGE 3A CHRONIC KIDNEY DISEASE (MULTI): ICD-10-CM

## 2023-11-06 DIAGNOSIS — L97.523 NON-PRESSURE CHRONIC ULCER OF OTHER PART OF LEFT FOOT WITH NECROSIS OF MUSCLE (MULTI): ICD-10-CM

## 2023-11-06 DIAGNOSIS — E11.21 DIABETIC NEPHROPATHY ASSOCIATED WITH TYPE 2 DIABETES MELLITUS (MULTI): ICD-10-CM

## 2023-11-06 DIAGNOSIS — E78.00 HYPERCHOLESTEROLEMIA: ICD-10-CM

## 2023-11-06 DIAGNOSIS — Z89.422: ICD-10-CM

## 2023-11-06 PROBLEM — E66.811 CLASS 1 OBESITY DUE TO EXCESS CALORIES WITH SERIOUS COMORBIDITY AND BODY MASS INDEX (BMI) OF 30.0 TO 30.9 IN ADULT: Status: ACTIVE | Noted: 2023-11-06

## 2023-11-06 PROCEDURE — 1159F MED LIST DOCD IN RCRD: CPT | Performed by: FAMILY MEDICINE

## 2023-11-06 PROCEDURE — 3074F SYST BP LT 130 MM HG: CPT | Performed by: FAMILY MEDICINE

## 2023-11-06 PROCEDURE — 1036F TOBACCO NON-USER: CPT | Performed by: FAMILY MEDICINE

## 2023-11-06 PROCEDURE — 3079F DIAST BP 80-89 MM HG: CPT | Performed by: FAMILY MEDICINE

## 2023-11-06 PROCEDURE — 99215 OFFICE O/P EST HI 40 MIN: CPT | Performed by: FAMILY MEDICINE

## 2023-11-06 PROCEDURE — 1160F RVW MEDS BY RX/DR IN RCRD: CPT | Performed by: FAMILY MEDICINE

## 2023-11-06 RX ORDER — PYRIDOXINE HCL (VITAMIN B6) 100 MG
100 TABLET ORAL DAILY
COMMUNITY

## 2023-11-06 RX ORDER — AMLODIPINE BESYLATE 10 MG/1
10 TABLET ORAL DAILY
Qty: 90 TABLET | Refills: 3 | Status: SHIPPED | OUTPATIENT
Start: 2023-11-06 | End: 2024-11-05

## 2023-11-06 RX ORDER — LISINOPRIL 20 MG/1
20 TABLET ORAL DAILY
Qty: 90 TABLET | Refills: 3 | Status: SHIPPED | OUTPATIENT
Start: 2023-11-06 | End: 2024-11-05

## 2023-11-06 RX ORDER — METFORMIN HYDROCHLORIDE 500 MG/1
500 TABLET, EXTENDED RELEASE ORAL 2 TIMES DAILY
Qty: 180 TABLET | Refills: 3 | Status: SHIPPED | OUTPATIENT
Start: 2023-11-06 | End: 2024-11-05

## 2023-11-06 RX ORDER — GABAPENTIN 600 MG/1
600 TABLET ORAL 3 TIMES DAILY
Qty: 270 TABLET | Refills: 3 | Status: SHIPPED | OUTPATIENT
Start: 2023-11-06 | End: 2024-11-05

## 2023-11-06 NOTE — PATIENT INSTRUCTIONS
For the gith calf muscles and cramps, you should stretch the calves with a towel twice a day for 30 seconds.

## 2023-11-06 NOTE — PROGRESS NOTES
Subjective   Patient ID: Tish Linton is a 78 y.o. female who presents for Med Management.    HPI     Cataracts - No issues, stable. Follows with optometrist yearly.      Asthma - has been good. Uses the inhaler, uses 2-3 times per week in past year which has increased.  Had COVID-19 in 2021 and 2022 and still some THOMAS but much better. States she feels more SOB when laying at night. Denies SOB waking her up at night. Denies chest pain.     Diabetes - A1C 8.0%. Home glucose running in mid 100s. No low sugars. No Chest pain, palpitations, numbness, weakness, edema, claudication, or double vision/ loss of vision. States no change in diet, talking with daughter (who she lives with) about changing diet to include more salads, fruit and less sweets/dessert.     Obesity-BMI 30.36-stable, wanting to start eating better. States she is aware and trying to not eat as much sweets.      Diabetic neuropathy - burning in feet. Gabapentin increase did help. Decrease 50% with gabapentin. Numbness in right thigh, has lump to inner thigh. Denies affecting walking but states her right thigh feels numb. Was here in June and diagnosed with lipoma. Denies worsening of numbness, but notes pain in right thigh that it intermittent and described as aching. States pain is seldom. Has form for diabetic shoes. Has amputation of 2nd toe from nonhealing after surgery. Also calluses      Diabetic autonomic neuropathy - some leaking of stool but fiber helps. Stomach empties. States will take Imodium as needed. Gets lightheaded when standing up at times. Some when laying down. Encouraged fluids. Also gets some vertigo when she lays down and meclizine helps but states she is not taking this anymore as she has not needed. Covid encephalopathy resolved. Has form for diabetic shoes. Has amputation of 2nd toe from nonhealing after surgery. Also     GERD - No HB, Melena, dysphagia, or hematochezia. States she has issues with pills at times (with  Magnesium and Zinc), if she takes with water she notes no issues. Daily PPI Nexium 20 daily. Will take a second if high acid food.     HTN - has been good at home 130s and 140s/80s. No Chest pain, numbness, weakness, edema, claudication, or double vision/ loss of vision.     Hypercholesteremia - on Lipitor and doing well  in April      Atrial fibrillation - paroxysmal. Feels like she is doing well. Dr Coley. Eliquis. Stopped propafenone, but is on metoprolol. No racing or fluttering.     Insomnia - back on Melatonin and not much help.      Low Back pain bothers her some if over lifting.    Memory daughter reported that she is concerned about the memory.. She reports words and names at times.       Onychomycosis - needs trimmed due to thickness, just seen Dr Guzman and has this done. States Dr. Guzman will send in cream order for her neuropathy in feet. To get diabetic shoes.      Osteoarthritis - Injects at times in knee. Considering TKR, but does not want to go this route if she doesn't need it.  Follows with Dr Neri and she had just received cortisone shot in her left knee 10/19/23. Notes minimal relief, still difficulty with stairs.      PAD - No sores on feet now. She has had one amputated on left. Decreased sensation of feet.      CKD 3 - No blood GFR 54, stable. States she drinks a lot of fluid, and tries to not take NSAID, uses tylenol if needed though.      Vitamin D - on Vitamin D with Multi.   Level 51.8, on 11-2-23.    Hgb 12.8, hematocrit 41.2%, MCV 81, MCH 25.1, MCHC 31.1, RDW 14.6. Will order ferritin, iron, and TIBC profile. Denies blood in urine. States when she gets diarrhea or constipation she will get sore and have some blood on toliet paper and notes she thinks it is from hemorrhoids.    Bilateral muscle cramps in lower legs for past 3 months. States she started taking calcium 600 mg BID one month ago, denies noting any difference yet. Notes cramps are worse at night. States she will  reposition legs and cramp will lessen. States cramps start when she stretches out in bed. Does already take magnesium and zinc supplement daily. Magnesium last checked on 4/20/23 and good, but has been low in the past.      Cologuard 8/26/20.  Now age 77.   Mammogram 9/2/20.   DEXA 9/2/20 FRAX 16/2.4  LW and DPA - daughter Pushpa.   Pneumovax UTD.    In process Shingrix, 3/27/23       Review of Systems    Objective   /84 (BP Location: Left arm, Patient Position: Sitting)   Pulse 75   Wt 73.5 kg (162 lb)   SpO2 99%   BMI 30.36 kg/m²     Physical Exam  HENT:      Right Ear: Tympanic membrane, ear canal and external ear normal.      Left Ear: Tympanic membrane, ear canal and external ear normal.      Mouth/Throat:      Mouth: Mucous membranes are moist.      Pharynx: Oropharynx is clear.      Comments: Upper and lower dentures  Eyes:      Conjunctiva/sclera: Conjunctivae normal.   Cardiovascular:      Rate and Rhythm: Normal rate and regular rhythm.      Pulses: Normal pulses.      Heart sounds: Normal heart sounds.   Pulmonary:      Effort: Pulmonary effort is normal.      Breath sounds: Normal breath sounds.   Abdominal:      General: Bowel sounds are normal.      Palpations: Abdomen is soft.      Tenderness: There is no abdominal tenderness.   Musculoskeletal:      Comments: Tightness of gastrocs.   Lipoma medial right thigh but has numbness and sensitivity lateral    Skin:     General: Skin is warm and dry.      Comments: Lesion (marbeled sized subQ lesion inner right thigh. Not tender.)    Neurological:      Mental Status: She is alert and oriented to person, place, and time.      Sensory: Sensory deficit (notes abnormal sensation to right thigh, but still aware when light touch is applied) present.      Comments: Decreased vibe sense of both feet. Calluses on great MP. Thick nails.    Psychiatric:         Mood and Affect: Mood normal.         Thought Content: Thought content normal.         Judgment:  Judgment normal.         Assessment/Plan   Problem List Items Addressed This Visit             ICD-10-CM    Age-related cataract of both eyes H25.9    Asthma J45.909    Atrial fibrillation (CMS/[facility]) - Primary I48.91    Relevant Medications    amLODIPine (Norvasc) 10 mg tablet    Class 1 obesity due to excess calories with serious comorbidity and body mass index (BMI) of 30.0 to 30.9 in adult E66.09, Z68.30    Diabetes mellitus type 2 with peripheral artery disease (CMS/[facility]) E11.51    Relevant Medications    metFORMIN  mg 24 hr tablet    Other Relevant Orders    Hemoglobin A1C    Lipid Panel    Comprehensive Metabolic Panel    Diabetic autonomic neuropathy associated with type 2 diabetes mellitus (CMS/[facility]) E11.43    Diabetic nephropathy associated with type 2 diabetes mellitus (CMS/[facility]) E11.21    Relevant Medications    gabapentin (Neurontin) 600 mg tablet    GERD (gastroesophageal reflux disease) K21.9    HTN (hypertension) I10    Relevant Medications    amLODIPine (Norvasc) 10 mg tablet    lisinopril 20 mg tablet    Hypercholesterolemia E78.00    Hypomagnesemia E83.42    Insomnia G47.00    Lipoma of right thigh D17.23    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 (CMS/[facility]) L97.523    Onychomycosis B35.1    Osteoarthritis of knee M17.9    Stage 3a chronic kidney disease (CMS/[facility]) N18.31    Vitamin D deficiency E55.9     Other Visit Diagnoses         Codes    Abnormal RBC indices     R71.8    Relevant Orders    CBC    Ferritin    Iron and TIBC    Leg cramps     R25.2    Relevant Orders    Magnesium    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 I reviewed history and examined patient independently and updated as needed.

## 2023-11-27 ENCOUNTER — TELEPHONE (OUTPATIENT)
Dept: PRIMARY CARE | Facility: CLINIC | Age: 78
End: 2023-11-27
Payer: MEDICARE

## 2023-11-27 DIAGNOSIS — J20.9 ACUTE BRONCHITIS, UNSPECIFIED ORGANISM: Primary | ICD-10-CM

## 2023-11-27 RX ORDER — AZITHROMYCIN 250 MG/1
TABLET, FILM COATED ORAL
Qty: 6 TABLET | Refills: 0 | Status: SHIPPED | OUTPATIENT
Start: 2023-11-27 | End: 2023-12-02

## 2023-12-05 ENCOUNTER — PHARMACY VISIT (OUTPATIENT)
Dept: PHARMACY | Facility: CLINIC | Age: 78
End: 2023-12-05
Payer: COMMERCIAL

## 2023-12-05 PROCEDURE — RXMED WILLOW AMBULATORY MEDICATION CHARGE

## 2024-02-08 ENCOUNTER — OFFICE VISIT (OUTPATIENT)
Dept: PRIMARY CARE | Facility: CLINIC | Age: 79
End: 2024-02-08
Payer: MEDICARE

## 2024-02-08 VITALS
DIASTOLIC BLOOD PRESSURE: 82 MMHG | HEIGHT: 62 IN | SYSTOLIC BLOOD PRESSURE: 124 MMHG | OXYGEN SATURATION: 95 % | BODY MASS INDEX: 30.91 KG/M2 | WEIGHT: 168 LBS | HEART RATE: 101 BPM

## 2024-02-08 DIAGNOSIS — J20.9 ACUTE BRONCHITIS, UNSPECIFIED ORGANISM: Primary | ICD-10-CM

## 2024-02-08 PROCEDURE — 1159F MED LIST DOCD IN RCRD: CPT | Performed by: FAMILY MEDICINE

## 2024-02-08 PROCEDURE — 1160F RVW MEDS BY RX/DR IN RCRD: CPT | Performed by: FAMILY MEDICINE

## 2024-02-08 PROCEDURE — 99213 OFFICE O/P EST LOW 20 MIN: CPT | Performed by: FAMILY MEDICINE

## 2024-02-08 PROCEDURE — 1036F TOBACCO NON-USER: CPT | Performed by: FAMILY MEDICINE

## 2024-02-08 PROCEDURE — 3079F DIAST BP 80-89 MM HG: CPT | Performed by: FAMILY MEDICINE

## 2024-02-08 PROCEDURE — 3074F SYST BP LT 130 MM HG: CPT | Performed by: FAMILY MEDICINE

## 2024-02-08 RX ORDER — AZITHROMYCIN 250 MG/1
TABLET, FILM COATED ORAL
Qty: 6 TABLET | Refills: 0 | Status: SHIPPED | OUTPATIENT
Start: 2024-02-08 | End: 2024-02-13

## 2024-02-08 ASSESSMENT — ENCOUNTER SYMPTOMS: WHEEZING: 1

## 2024-02-08 NOTE — PROGRESS NOTES
"Subjective   Patient ID: Tish Linton is a 78 y.o. female who presents for Wheezing (Pain behind left shoulder, some cough).    Wheezing        1 week   Wheezy   Pain on left in shoulder blade and left lower anterior  Some increase with breathing  Short of breath  No fever or chills  Cough is deep but not prevalent  No SN,RN, PND      Review of Systems   Respiratory:  Positive for wheezing.        Objective   /82 (BP Location: Left arm, Patient Position: Sitting)   Pulse 101   Ht 1.562 m (5' 1.5\")   Wt 76.2 kg (168 lb)   SpO2 95%   BMI 31.23 kg/m²     Physical Exam  Constitutional:       Appearance: Normal appearance.   HENT:      Head: Normocephalic.      Right Ear: Tympanic membrane, ear canal and external ear normal.      Left Ear: Tympanic membrane, ear canal and external ear normal.      Nose: Nose normal.      Mouth/Throat:      Mouth: Mucous membranes are moist.      Pharynx: Oropharynx is clear.      Comments: Dentures   Eyes:      Extraocular Movements: Extraocular movements intact.      Conjunctiva/sclera: Conjunctivae normal.      Pupils: Pupils are equal, round, and reactive to light.   Cardiovascular:      Rate and Rhythm: Normal rate and regular rhythm.   Pulmonary:      Effort: Pulmonary effort is normal.      Breath sounds: Rhonchi (LLL) present.   Musculoskeletal:      Cervical back: Normal range of motion and neck supple.   Neurological:      General: No focal deficit present.      Mental Status: She is alert and oriented to person, place, and time.   Psychiatric:         Mood and Affect: Mood normal.         Behavior: Behavior normal.         Thought Content: Thought content normal.         Judgment: Judgment normal.         Assessment/Plan   Diagnoses and all orders for this visit:  Acute bronchitis, unspecified organism  -     azithromycin (Zithromax) 250 mg tablet; Take 2 tablets (500 mg) by mouth once daily for 1 day, THEN 1 tablet (250 mg) once daily for 4 days. Take 2 tabs " (500 mg) by mouth today, than 1 daily for 4 days..    Treat with Z-Fabio and if it persists will get CXR

## 2024-02-19 ENCOUNTER — TELEPHONE (OUTPATIENT)
Dept: PRIMARY CARE | Facility: CLINIC | Age: 79
End: 2024-02-19
Payer: MEDICARE

## 2024-02-19 DIAGNOSIS — J20.9 ACUTE BRONCHITIS, UNSPECIFIED ORGANISM: Primary | ICD-10-CM

## 2024-02-19 NOTE — TELEPHONE ENCOUNTER
Stating she is still coughing and SOB. Asking if she needs to get a chest x-ray. Asking for a call back.

## 2024-04-02 ENCOUNTER — TELEPHONE (OUTPATIENT)
Dept: PHARMACY | Facility: HOSPITAL | Age: 79
End: 2024-04-02
Payer: MEDICARE

## 2024-04-02 NOTE — TELEPHONE ENCOUNTER
Population Health: Outreach by Ambulatory Pharmacy Team    Payor: Joey KOWALSKI  Reason: Adherence  Medication: ATORVASTATIN 40 MG TABLET  Outcome: Patient Reached: Barriers Identified, Reports missing a dose or two every month, no cost or ADR concerns. Counseled on the importance of adherence.    Atorvastatin was last filled on 1/15/24 for 90 days + 1 refill and had a few gaps in fill between 4/9/23, 7/15/23, 10/17/23, and 1/15/24    Amber Sagastume, PharmD

## 2024-05-06 ENCOUNTER — OFFICE VISIT (OUTPATIENT)
Dept: PRIMARY CARE | Facility: CLINIC | Age: 79
End: 2024-05-06
Payer: MEDICARE

## 2024-05-06 ENCOUNTER — LAB (OUTPATIENT)
Dept: LAB | Facility: LAB | Age: 79
End: 2024-05-06
Payer: MEDICARE

## 2024-05-06 VITALS
HEIGHT: 62 IN | SYSTOLIC BLOOD PRESSURE: 120 MMHG | HEART RATE: 97 BPM | BODY MASS INDEX: 29.26 KG/M2 | OXYGEN SATURATION: 94 % | WEIGHT: 159 LBS | DIASTOLIC BLOOD PRESSURE: 70 MMHG

## 2024-05-06 DIAGNOSIS — E83.42 HYPOMAGNESEMIA: ICD-10-CM

## 2024-05-06 DIAGNOSIS — Z89.422: ICD-10-CM

## 2024-05-06 DIAGNOSIS — R25.2 LEG CRAMPS: ICD-10-CM

## 2024-05-06 DIAGNOSIS — E11.21 DIABETIC NEPHROPATHY ASSOCIATED WITH TYPE 2 DIABETES MELLITUS (MULTI): ICD-10-CM

## 2024-05-06 DIAGNOSIS — F51.04 PSYCHOPHYSIOLOGICAL INSOMNIA: ICD-10-CM

## 2024-05-06 DIAGNOSIS — H25.9 AGE-RELATED CATARACT OF BOTH EYES, UNSPECIFIED AGE-RELATED CATARACT TYPE: ICD-10-CM

## 2024-05-06 DIAGNOSIS — I48.0 PAROXYSMAL ATRIAL FIBRILLATION (MULTI): ICD-10-CM

## 2024-05-06 DIAGNOSIS — E78.00 HYPERCHOLESTEROLEMIA: ICD-10-CM

## 2024-05-06 DIAGNOSIS — E55.9 VITAMIN D DEFICIENCY: ICD-10-CM

## 2024-05-06 DIAGNOSIS — Z00.00 ROUTINE GENERAL MEDICAL EXAMINATION AT HEALTH CARE FACILITY: Primary | ICD-10-CM

## 2024-05-06 DIAGNOSIS — N18.31 STAGE 3A CHRONIC KIDNEY DISEASE (MULTI): ICD-10-CM

## 2024-05-06 DIAGNOSIS — R71.8 ABNORMAL RBC INDICES: ICD-10-CM

## 2024-05-06 DIAGNOSIS — E11.51 DIABETES MELLITUS TYPE 2 WITH PERIPHERAL ARTERY DISEASE (MULTI): ICD-10-CM

## 2024-05-06 DIAGNOSIS — I10 PRIMARY HYPERTENSION: ICD-10-CM

## 2024-05-06 DIAGNOSIS — E66.3 OVERWEIGHT WITH BODY MASS INDEX (BMI) OF 29 TO 29.9 IN ADULT: ICD-10-CM

## 2024-05-06 DIAGNOSIS — E11.43 DIABETIC AUTONOMIC NEUROPATHY ASSOCIATED WITH TYPE 2 DIABETES MELLITUS (MULTI): ICD-10-CM

## 2024-05-06 DIAGNOSIS — K21.9 GASTROESOPHAGEAL REFLUX DISEASE WITHOUT ESOPHAGITIS: ICD-10-CM

## 2024-05-06 DIAGNOSIS — J45.20 MILD INTERMITTENT ASTHMA WITHOUT COMPLICATION (HHS-HCC): ICD-10-CM

## 2024-05-06 DIAGNOSIS — G57.11 MERALGIA PARESTHETICA, RIGHT: ICD-10-CM

## 2024-05-06 PROBLEM — L97.523: Status: RESOLVED | Noted: 2023-11-06 | Resolved: 2024-05-06

## 2024-05-06 LAB
ALBUMIN SERPL BCP-MCNC: 4.4 G/DL (ref 3.4–5)
ALP SERPL-CCNC: 56 U/L (ref 33–136)
ALT SERPL W P-5'-P-CCNC: 12 U/L (ref 7–45)
ANION GAP SERPL CALC-SCNC: 14 MMOL/L (ref 10–20)
AST SERPL W P-5'-P-CCNC: 13 U/L (ref 9–39)
BILIRUB SERPL-MCNC: 0.8 MG/DL (ref 0–1.2)
BUN SERPL-MCNC: 13 MG/DL (ref 6–23)
CALCIUM SERPL-MCNC: 9.4 MG/DL (ref 8.6–10.3)
CHLORIDE SERPL-SCNC: 100 MMOL/L (ref 98–107)
CHOLEST SERPL-MCNC: 90 MG/DL (ref 0–199)
CHOLESTEROL/HDL RATIO: 2.6
CO2 SERPL-SCNC: 24 MMOL/L (ref 21–32)
CREAT SERPL-MCNC: 1.18 MG/DL (ref 0.5–1.05)
EGFRCR SERPLBLD CKD-EPI 2021: 47 ML/MIN/1.73M*2
ERYTHROCYTE [DISTWIDTH] IN BLOOD BY AUTOMATED COUNT: 17 % (ref 11.5–14.5)
EST. AVERAGE GLUCOSE BLD GHB EST-MCNC: 146 MG/DL
FERRITIN SERPL-MCNC: 36 NG/ML (ref 8–150)
GLUCOSE SERPL-MCNC: 104 MG/DL (ref 74–99)
HBA1C MFR BLD: 6.7 %
HCT VFR BLD AUTO: 38.2 % (ref 36–46)
HDLC SERPL-MCNC: 34 MG/DL
HGB BLD-MCNC: 11.3 G/DL (ref 12–16)
IRON SATN MFR SERPL: 9 % (ref 25–45)
IRON SERPL-MCNC: 42 UG/DL (ref 35–150)
LDLC SERPL CALC-MCNC: 27 MG/DL
MAGNESIUM SERPL-MCNC: 1.67 MG/DL (ref 1.6–2.4)
MCH RBC QN AUTO: 24.4 PG (ref 26–34)
MCHC RBC AUTO-ENTMCNC: 29.6 G/DL (ref 32–36)
MCV RBC AUTO: 83 FL (ref 80–100)
NON HDL CHOLESTEROL: 56 MG/DL (ref 0–149)
NRBC BLD-RTO: 0 /100 WBCS (ref 0–0)
PLATELET # BLD AUTO: 327 X10*3/UL (ref 150–450)
POTASSIUM SERPL-SCNC: 4.2 MMOL/L (ref 3.5–5.3)
PROT SERPL-MCNC: 7.1 G/DL (ref 6.4–8.2)
RBC # BLD AUTO: 4.63 X10*6/UL (ref 4–5.2)
SODIUM SERPL-SCNC: 134 MMOL/L (ref 136–145)
TIBC SERPL-MCNC: 460 UG/DL (ref 240–445)
TRIGL SERPL-MCNC: 145 MG/DL (ref 0–149)
UIBC SERPL-MCNC: 418 UG/DL (ref 110–370)
VLDL: 29 MG/DL (ref 0–40)
WBC # BLD AUTO: 9 X10*3/UL (ref 4.4–11.3)

## 2024-05-06 PROCEDURE — 83550 IRON BINDING TEST: CPT

## 2024-05-06 PROCEDURE — 1036F TOBACCO NON-USER: CPT | Performed by: FAMILY MEDICINE

## 2024-05-06 PROCEDURE — 36415 COLL VENOUS BLD VENIPUNCTURE: CPT

## 2024-05-06 PROCEDURE — 3074F SYST BP LT 130 MM HG: CPT | Performed by: FAMILY MEDICINE

## 2024-05-06 PROCEDURE — 1160F RVW MEDS BY RX/DR IN RCRD: CPT | Performed by: FAMILY MEDICINE

## 2024-05-06 PROCEDURE — 85027 COMPLETE CBC AUTOMATED: CPT

## 2024-05-06 PROCEDURE — 1158F ADVNC CARE PLAN TLK DOCD: CPT | Performed by: FAMILY MEDICINE

## 2024-05-06 PROCEDURE — 83036 HEMOGLOBIN GLYCOSYLATED A1C: CPT

## 2024-05-06 PROCEDURE — 1123F ACP DISCUSS/DSCN MKR DOCD: CPT | Performed by: FAMILY MEDICINE

## 2024-05-06 PROCEDURE — 83540 ASSAY OF IRON: CPT

## 2024-05-06 PROCEDURE — G0439 PPPS, SUBSEQ VISIT: HCPCS | Performed by: FAMILY MEDICINE

## 2024-05-06 PROCEDURE — 3078F DIAST BP <80 MM HG: CPT | Performed by: FAMILY MEDICINE

## 2024-05-06 PROCEDURE — 82728 ASSAY OF FERRITIN: CPT

## 2024-05-06 PROCEDURE — 1159F MED LIST DOCD IN RCRD: CPT | Performed by: FAMILY MEDICINE

## 2024-05-06 PROCEDURE — 1170F FXNL STATUS ASSESSED: CPT | Performed by: FAMILY MEDICINE

## 2024-05-06 PROCEDURE — 80053 COMPREHEN METABOLIC PANEL: CPT

## 2024-05-06 PROCEDURE — 83735 ASSAY OF MAGNESIUM: CPT

## 2024-05-06 PROCEDURE — 80061 LIPID PANEL: CPT

## 2024-05-06 RX ORDER — ALBUTEROL SULFATE 90 UG/1
2 AEROSOL, METERED RESPIRATORY (INHALATION) EVERY 6 HOURS PRN
Qty: 18 G | Refills: 3 | Status: SHIPPED | OUTPATIENT
Start: 2024-05-06

## 2024-05-06 ASSESSMENT — ACTIVITIES OF DAILY LIVING (ADL)
DRESSING: INDEPENDENT
BATHING: INDEPENDENT
GROCERY_SHOPPING: INDEPENDENT
TAKING_MEDICATION: INDEPENDENT
MANAGING_FINANCES: INDEPENDENT
DOING_HOUSEWORK: INDEPENDENT

## 2024-05-06 ASSESSMENT — PATIENT HEALTH QUESTIONNAIRE - PHQ9
1. LITTLE INTEREST OR PLEASURE IN DOING THINGS: NOT AT ALL
SUM OF ALL RESPONSES TO PHQ9 QUESTIONS 1 AND 2: 0
2. FEELING DOWN, DEPRESSED OR HOPELESS: NOT AT ALL
2. FEELING DOWN, DEPRESSED OR HOPELESS: NOT AT ALL
1. LITTLE INTEREST OR PLEASURE IN DOING THINGS: NOT AT ALL
SUM OF ALL RESPONSES TO PHQ9 QUESTIONS 1 AND 2: 0

## 2024-05-06 NOTE — PROGRESS NOTES
Subjective   Reason for Visit: Tish Linton is an 78 y.o. female here for a Medicare Wellness visit.     Past Medical, Surgical, and Family History reviewed and updated in chart.    Reviewed all medications by prescribing practitioner or clinical pharmacist (such as prescriptions, OTCs, herbal therapies and supplements) and documented in the medical record.    HPI  Cataracts - No issues, stable. Follows with optometrist yearly. Has been awhile.. Able to read and watch TV. Avoids night vision. No getting loss.      Asthma - had been good. But had bronchitis in February. Uses the inhaler 1-2 times per day recently.  Does help.  Also had COVID-19 in 2021 and 2022 and still some THOMAS.   States she feels more SOB when laying at night. Denies SOB waking her up at night. Denies chest pain.     Diabetes - A1C 8.0% last time. Needs to get labs soon.  Home glucose running in mid 100s. No low sugars. No Chest pain, palpitations, numbness, weakness, edema, claudication, or double vision/ loss of vision.      Obesity-BMI 29.56. -down 3. Wanting to start eating better. States she is aware and trying to not eat as much sweets.      Diabetic neuropathy - burning in feet. Gabapentin increase did help. Decrease 50% with gabapentin. Numbness in right thigh, has lump to inner thigh. Denies affecting walking but states her right thigh feels numb. Was here in June and diagnosed with lipoma. Denies worsening of numbness, but notes pain in right thigh that it intermittent and described as aching. States pain is seldom.  Has amputation of 2nd toe from nonhealing after surgery. Also calluses  Did diabetic shoes. Has amputation of 2nd toe from nonhealing after surgery. Also     Diabetic autonomic neuropathy - some leaking of stool but fiber helps. Stomach empties. States will take Imodium as needed. Gets lightheaded when standing up at times. Some when laying down. Encouraged fluids.No longer gets some vertigo when she lays down and  meclizine helps but states she is not taking this anymore as she has not needed. Covid encephalopathy resolved.      GERD - No HB, Melena, dysphagia, or hematochezia. States she has issues with pills at times (with Magnesium and Zinc), if she takes with water she notes no issues. Daily PPI Nexium 20 daily. Will take a second if high acid food.     HTN - No Chest pain, numbness, weakness, edema, claudication, or double vision/ loss of vision.     Hypercholesteremia - on Lipitor and doing well but needs to check today.      Atrial fibrillation - paroxysmal. Feels like she is doing well. Dr Coley. Eliquis. Stopped propafenone, but is on metoprolol. No racing or fluttering.      Insomnia - back on Melatonin and not much help.      Low Back pain bothers her some if over lifting.     Memory daughter reported that she is concerned about the memory.  She reports words and names at times.       Onychomycosis - needs trimmed due to thickness, just seen Dr Guzman and has this done. Also removes calluses      Osteoarthritis - Injects at times in knees and had recently. Did not help. Considering TKR, but does not want to go this route if she doesn't need it.  Difficulty with stairs. Gel injection considered      PAD - No sores on feet now. She has had one amputated on left. Decreased sensation of feet.      CKD 3 - No blood GFR 54 last time.  Labs pending. States she drinks a lot of fluid, and tries to not take NSAID, uses tylenol if needed though.      Vitamin D - on Vitamin D with Multi.   Level 51.8, on 11-2-23.     La st timeHgb 12.8, hematocrit 41.2%, MCV 81, MCH 25.1, MCHC 31.1, RDW 14.6. Will order ferritin, iron, and TIBC profile. Denies blood in urine. States when she gets diarrhea or constipation she will get sore and have some blood on toilet paper and notes she thinks it is from hemorrhoids.     Bilateral muscle cramps in lower legs better with stretching.  States she started taking calcium 600 mg BID along with  "magnesium and zinc.  Levels today      Cologuard 8/26/20.  Now age 77.   Mammogram 9/2/20.   DEXA 9/2/20 FRAX 16/2.4  LW and DPA - daughter Pushpa and son Fly.   Pneumovax UTD.    Shingrix UTD     Patient Care Team:  Cy Walters MD as PCP - General  Cy Walters MD as PCP - [employer] PCP     Review of Systems    Objective   Vitals:  /70 (BP Location: Right arm, Patient Position: Sitting)   Pulse 97   Ht 1.562 m (5' 1.5\")   Wt 72.1 kg (159 lb)   SpO2 94%   BMI 29.56 kg/m²       Physical Exam  Vitals reviewed.   Constitutional:       General: She is not in acute distress.     Appearance: Normal appearance.   HENT:      Head: Normocephalic.      Right Ear: Tympanic membrane, ear canal and external ear normal.      Left Ear: Tympanic membrane, ear canal and external ear normal.      Nose: Nose normal.      Mouth/Throat:      Mouth: Mucous membranes are moist.      Pharynx: Oropharynx is clear.   Eyes:      Extraocular Movements: Extraocular movements intact.      Conjunctiva/sclera: Conjunctivae normal.      Pupils: Pupils are equal, round, and reactive to light.   Neck:      Vascular: No carotid bruit.   Cardiovascular:      Rate and Rhythm: Normal rate and regular rhythm.      Pulses: Normal pulses.      Heart sounds: Normal heart sounds. No murmur heard.  Pulmonary:      Effort: Pulmonary effort is normal. No respiratory distress.      Breath sounds: Normal breath sounds.   Abdominal:      General: Abdomen is flat. Bowel sounds are normal. There is no distension.      Palpations: Abdomen is soft. There is no mass.      Tenderness: There is no abdominal tenderness.   Musculoskeletal:      Cervical back: Normal range of motion and neck supple. No tenderness.      Comments: Amputation of 2nd left toe    Lymphadenopathy:      Cervical: No cervical adenopathy.   Skin:     General: Skin is warm and dry.      Findings: No rash.   Neurological:      General: No focal deficit present.      " Mental Status: She is alert and oriented to person, place, and time.   Psychiatric:         Mood and Affect: Mood normal.         Thought Content: Thought content normal.         Judgment: Judgment normal.         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

## 2024-05-07 NOTE — RESULT ENCOUNTER NOTE
Let her know the labs look pretty good. Kidney function is a little lower than it was. So be sure to get in plenty of fluids and avoid ibuprofen and naproxen for pain as much as possible.  She has mild anemia but good iron levels. So probably normal for her now.  Sugars look very good.  So no changes needed.

## 2024-06-06 ENCOUNTER — TELEPHONE (OUTPATIENT)
Dept: PRIMARY CARE | Facility: CLINIC | Age: 79
End: 2024-06-06
Payer: MEDICARE

## 2024-06-06 NOTE — TELEPHONE ENCOUNTER
06/06/24  Patient dropped off a Pidiatry Order for Diabetic shoes/inserts.  She states that Dr. Walters needs to sign it.  Please call patient when complete.     [phone]

## 2024-07-04 ENCOUNTER — TELEPHONE (OUTPATIENT)
Dept: PRIMARY CARE | Facility: CLINIC | Age: 79
End: 2024-07-04
Payer: MEDICARE

## 2024-07-04 DIAGNOSIS — E78.00 HYPERCHOLESTEROLEMIA: ICD-10-CM

## 2024-07-04 DIAGNOSIS — I48.0 PAROXYSMAL ATRIAL FIBRILLATION (MULTI): ICD-10-CM

## 2024-07-04 DIAGNOSIS — J45.909 ASTHMA, UNSPECIFIED ASTHMA SEVERITY, UNSPECIFIED WHETHER COMPLICATED, UNSPECIFIED WHETHER PERSISTENT (HHS-HCC): ICD-10-CM

## 2024-07-05 RX ORDER — MONTELUKAST SODIUM 10 MG/1
10 TABLET ORAL NIGHTLY
Qty: 90 TABLET | Refills: 0 | OUTPATIENT
Start: 2024-07-05

## 2024-07-05 RX ORDER — ATORVASTATIN CALCIUM 40 MG/1
40 TABLET, FILM COATED ORAL NIGHTLY
Qty: 90 TABLET | Refills: 0 | OUTPATIENT
Start: 2024-07-05

## 2024-07-05 RX ORDER — METOPROLOL TARTRATE 75 MG/1
75 TABLET, FILM COATED ORAL 2 TIMES DAILY
Qty: 180 TABLET | Refills: 0 | OUTPATIENT
Start: 2024-07-05

## 2024-07-11 DIAGNOSIS — E78.00 HYPERCHOLESTEROLEMIA: ICD-10-CM

## 2024-07-12 RX ORDER — MONTELUKAST SODIUM 10 MG/1
10 TABLET ORAL NIGHTLY
Qty: 90 TABLET | Refills: 3 | Status: SHIPPED | OUTPATIENT
Start: 2024-07-12 | End: 2025-07-12

## 2024-07-12 RX ORDER — METOPROLOL TARTRATE 75 MG/1
75 TABLET, FILM COATED ORAL 2 TIMES DAILY
Qty: 180 TABLET | Refills: 3 | Status: SHIPPED | OUTPATIENT
Start: 2024-07-12 | End: 2025-07-12

## 2024-07-12 RX ORDER — ATORVASTATIN CALCIUM 40 MG/1
40 TABLET, FILM COATED ORAL NIGHTLY
Qty: 90 TABLET | Refills: 0 | Status: SHIPPED | OUTPATIENT
Start: 2024-07-12

## 2024-07-12 NOTE — TELEPHONE ENCOUNTER
Patient called in and would like a refill of the following medication.....montelukast (Singulair) 10 mg tablet , metoprolol tartrate (Lopressor) 75 mg tablet called into [facility]

## 2024-07-26 ENCOUNTER — TELEPHONE (OUTPATIENT)
Dept: PRIMARY CARE | Facility: CLINIC | Age: 79
End: 2024-07-26
Payer: MEDICARE

## 2024-07-26 NOTE — TELEPHONE ENCOUNTER
Patient called in and is requesting a refill of the following medication..... atorvastatin (Lipitor) 40 mg tablet called into [facility] pharmacy in [place]

## 2024-07-26 NOTE — TELEPHONE ENCOUNTER
Pc to patient and let her know Atorvastatin was sent to [facility] on 7/12/24 for 90 days and ok to take Ginko Biloba supplement

## 2024-07-26 NOTE — TELEPHONE ENCOUNTER
07/26/24  Patient called in to see if she could take ginkgo biloba supplement.      Pt Ph: [phone]

## 2024-07-31 ENCOUNTER — OFFICE VISIT (OUTPATIENT)
Dept: PRIMARY CARE | Facility: CLINIC | Age: 79
End: 2024-07-31
Payer: MEDICARE

## 2024-07-31 ENCOUNTER — LAB (OUTPATIENT)
Dept: LAB | Facility: LAB | Age: 79
End: 2024-07-31
Payer: MEDICARE

## 2024-07-31 VITALS
SYSTOLIC BLOOD PRESSURE: 136 MMHG | OXYGEN SATURATION: 99 % | DIASTOLIC BLOOD PRESSURE: 80 MMHG | BODY MASS INDEX: 29.74 KG/M2 | HEART RATE: 86 BPM | WEIGHT: 160 LBS

## 2024-07-31 DIAGNOSIS — R60.0 LOCALIZED EDEMA: ICD-10-CM

## 2024-07-31 DIAGNOSIS — N18.31 STAGE 3A CHRONIC KIDNEY DISEASE (MULTI): ICD-10-CM

## 2024-07-31 DIAGNOSIS — I48.11 LONGSTANDING PERSISTENT ATRIAL FIBRILLATION (MULTI): Primary | ICD-10-CM

## 2024-07-31 LAB
ALBUMIN SERPL BCP-MCNC: 4.4 G/DL (ref 3.4–5)
ALP SERPL-CCNC: 66 U/L (ref 33–136)
ALT SERPL W P-5'-P-CCNC: 11 U/L (ref 7–45)
ANION GAP SERPL CALC-SCNC: 16 MMOL/L (ref 10–20)
AST SERPL W P-5'-P-CCNC: 14 U/L (ref 9–39)
BILIRUB SERPL-MCNC: 0.9 MG/DL (ref 0–1.2)
BUN SERPL-MCNC: 16 MG/DL (ref 6–23)
CALCIUM SERPL-MCNC: 9.5 MG/DL (ref 8.6–10.3)
CHLORIDE SERPL-SCNC: 101 MMOL/L (ref 98–107)
CO2 SERPL-SCNC: 24 MMOL/L (ref 21–32)
CREAT SERPL-MCNC: 1.11 MG/DL (ref 0.5–1.05)
EGFRCR SERPLBLD CKD-EPI 2021: 50 ML/MIN/1.73M*2
ERYTHROCYTE [DISTWIDTH] IN BLOOD BY AUTOMATED COUNT: 18.1 % (ref 11.5–14.5)
GLUCOSE SERPL-MCNC: 128 MG/DL (ref 74–99)
HCT VFR BLD AUTO: 39.5 % (ref 36–46)
HGB BLD-MCNC: 12.1 G/DL (ref 12–16)
MCH RBC QN AUTO: 25.3 PG (ref 26–34)
MCHC RBC AUTO-ENTMCNC: 30.6 G/DL (ref 32–36)
MCV RBC AUTO: 83 FL (ref 80–100)
NRBC BLD-RTO: 0 /100 WBCS (ref 0–0)
PLATELET # BLD AUTO: 281 X10*3/UL (ref 150–450)
POTASSIUM SERPL-SCNC: 4.5 MMOL/L (ref 3.5–5.3)
PROT SERPL-MCNC: 7.4 G/DL (ref 6.4–8.2)
RBC # BLD AUTO: 4.78 X10*6/UL (ref 4–5.2)
SODIUM SERPL-SCNC: 136 MMOL/L (ref 136–145)
WBC # BLD AUTO: 8.7 X10*3/UL (ref 4.4–11.3)

## 2024-07-31 PROCEDURE — 1160F RVW MEDS BY RX/DR IN RCRD: CPT | Performed by: FAMILY MEDICINE

## 2024-07-31 PROCEDURE — 99214 OFFICE O/P EST MOD 30 MIN: CPT | Performed by: FAMILY MEDICINE

## 2024-07-31 PROCEDURE — 3075F SYST BP GE 130 - 139MM HG: CPT | Performed by: FAMILY MEDICINE

## 2024-07-31 PROCEDURE — 1036F TOBACCO NON-USER: CPT | Performed by: FAMILY MEDICINE

## 2024-07-31 PROCEDURE — 85027 COMPLETE CBC AUTOMATED: CPT

## 2024-07-31 PROCEDURE — 80053 COMPREHEN METABOLIC PANEL: CPT

## 2024-07-31 PROCEDURE — 3079F DIAST BP 80-89 MM HG: CPT | Performed by: FAMILY MEDICINE

## 2024-07-31 PROCEDURE — 36415 COLL VENOUS BLD VENIPUNCTURE: CPT

## 2024-07-31 PROCEDURE — 1159F MED LIST DOCD IN RCRD: CPT | Performed by: FAMILY MEDICINE

## 2024-07-31 RX ORDER — PREGABALIN 75 MG/1
75 CAPSULE ORAL 2 TIMES DAILY
COMMUNITY

## 2024-07-31 NOTE — PROGRESS NOTES
Subjective   Patient ID: Tish Linton is a 79 y.o. female who presents for Follow-up (Ankles swelling  x1 week).    HPI   Noting swelling in the legs up to the knee  1 week  No diet change but did change from Gabapentin to Lyrica about 6 weeks ago   Still Amlodipine 10 mg for years   The Lyrica is for neuropathy. Does not seem to be making a difference   No chest pain or racing heart  No focal numbness weakness or nausea   Had bronchitis about 5 months ago and has been more SOB since then.   CXR did show enlarged heart   Did not have a recent ECHO  Glucose good   Arthritis in left knee and has had injections a few months ago.        Review of Systems    Objective   /80 (BP Location: Left arm, Patient Position: Sitting)   Pulse 86   Wt 72.6 kg (160 lb)   SpO2 99%   BMI 29.74 kg/m²     Physical Exam  Vitals reviewed.   Constitutional:       General: She is not in acute distress.     Appearance: Normal appearance. She is normal weight.   HENT:      Head: Normocephalic.      Right Ear: Tympanic membrane normal.      Left Ear: Tympanic membrane normal.      Nose: Nose normal.      Mouth/Throat:      Pharynx: Oropharynx is clear.   Eyes:      Extraocular Movements: Extraocular movements intact.      Conjunctiva/sclera: Conjunctivae normal.      Pupils: Pupils are equal, round, and reactive to light.   Neck:      Vascular: No carotid bruit.   Cardiovascular:      Rate and Rhythm: Normal rate. Rhythm irregular.      Pulses: Normal pulses.      Heart sounds: Normal heart sounds. No murmur heard.  Pulmonary:      Effort: Pulmonary effort is normal. No respiratory distress.      Breath sounds: Normal breath sounds.   Abdominal:      General: Abdomen is flat. Bowel sounds are normal. There is no distension.      Palpations: Abdomen is soft. There is no mass.      Tenderness: There is no abdominal tenderness.   Musculoskeletal:      Cervical back: Normal range of motion and neck supple. No tenderness.      Right  lower leg: Edema (1+) present.      Left lower leg: Edema (1+) present.   Lymphadenopathy:      Cervical: No cervical adenopathy.   Skin:     General: Skin is warm and dry.      Findings: No rash.   Neurological:      General: No focal deficit present.      Mental Status: She is alert and oriented to person, place, and time.   Psychiatric:         Mood and Affect: Mood normal.         Thought Content: Thought content normal.         Judgment: Judgment normal.         Assessment/Plan   Diagnoses and all orders for this visit:  Longstanding persistent atrial fibrillation (Multi)  -     Transthoracic Echo Complete; Future  Stage 3a chronic kidney disease (Multi)  -     Comprehensive Metabolic Panel; Future  -     CBC; Future  Localized edema  -     Transthoracic Echo Complete; Future  -     Comprehensive Metabolic Panel; Future  Possibilities for the edema include the Lyrica, Cardiomyopathy (enlarged heart), lungs (dyspnea), CKD 3a, and liver. Will get above labs and Echo. If unremarkable will then have her stop Lyrica to see if that is contributing

## 2024-08-01 ENCOUNTER — TELEPHONE (OUTPATIENT)
Dept: PRIMARY CARE | Facility: CLINIC | Age: 79
End: 2024-08-01
Payer: MEDICARE

## 2024-08-01 DIAGNOSIS — E78.00 HYPERCHOLESTEROLEMIA: ICD-10-CM

## 2024-08-01 RX ORDER — ATORVASTATIN CALCIUM 40 MG/1
40 TABLET, FILM COATED ORAL NIGHTLY
Qty: 90 TABLET | Refills: 0 | Status: SHIPPED | OUTPATIENT
Start: 2024-08-01

## 2024-08-01 NOTE — TELEPHONE ENCOUNTER
atorvastatin (Lipitor) 40 mg tablet [[id number]]    Order Details  Dose: 40 mg Route: oral Frequency: Nightly   Dispense Quantity: 90 tablet Refills: 0          Sig: TAKE 1 TABLET BY MOUTH ONCE DAILY AT BEDTIME   [facility] SAID THEY DON'T HAVE THIS. THANK YOU.    2023       Froilan Barry MD  [address] [place] [address] [address]  [facility] [address]  Via Fax: [phone]      Patient: David Nguyen   YOB: 1984   Date of Visit: 2023       Dear Dr. Barry:    Thank you for referring David Nguyen to me for evaluation. Below are my notes for this visit with him.    If you have questions, please do not hesitate to call me. I look forward to following your patient along with you.      Sincerely,        Andrea Lepe MD        CC: No Recipients  Andrea Lepe MD  2023  3:46 PM  Signed       Cardiology Clinic Consultation  [address]  [address]  [facility] [address]  Dept Phone: [phone]      David Nguyen  : 1984  PCP: Froilan Barry MD    Reason for Consultation:   Chief Complaint   Patient presents with   • Consultation     Abnormal ekg       History of Present Illness:  We had the pleasure of seeing David Nguyen in the [facility] [place]. Thank you for allowing me to see this patient in the office. As you know, this is a 39 year old male who presents with the chief complaint of Consultation (Abnormal ekg)  -Patient presents to clinic for establishment of care and evaluation of chest pressure.  -Patient reports left-sided chest pressure with some occasional radiation to his arm.  He reports the symptoms arcades with exertion but also at rest.  Occasionally notes some associated shortness of breath.  No palpitations or diaphoresis.  -He has significant family history of early MI. His father had MI at 36 and angioplasty of possibly the LCx in the early 90s  -Cousin with sudden death at age 39  -Reports that he was previously very physically active however most recently has been less active.  Does run around with his young children without significant physical limitations    Review of Systems:  A 12-point Review of Systems was performed and is negative  except for HPI.     Physical Exam:  Visit Vitals  /79   Pulse 71   Ht 5' 10\" (1.778 m)   Wt 84.4 kg (186 lb)   BMI 26.69 kg/m²     Wt Readings from Last 4 Encounters:   07/21/23 84.4 kg (186 lb)     Vital signs and previous weights were reviewed during today's visit.     Gen: no acute distress, AOx3  Head: Atraumatic, normocephalic  Neck: Supple, no JVP, no carotid bruit  CV: RRR, S1, S2, No G/R/M   Chest: Lungs BCTA, non-labored respirations   Abdomen: Nontender, nondistended  Musculoskeletal: No abnormalities or deformity  Neuro: No focal deficits, AOx3  Ext: warm, well perfused, no LE edema  Psych: Cooperative, calm    History reviewed. No pertinent past medical history.    History reviewed. No pertinent surgical history.    Family History   Problem Relation Age of Onset   • Myocardial Infarction Father 39   • Coronary Artery Disease Father        Social History     Tobacco Use   • Smoking status: Never   • Smokeless tobacco: Never   Vaping Use   • Vaping Use: never used   Substance Use Topics   • Alcohol use: Yes   • Drug use: Never       ALLERGIES:  No Known Allergies    Current Medications    No medications on file       David's PMH, PSH, Family Hx, Social Hx, Allergies, and Medications were reviewed with the patient and updated during today's visit. In addition, I discussed medication dosage, usage, goals of therapy, and side effects with him.    Labs/Testing:    Assessment/Plan:  39-year-old male with PMHx significant for family history of early MI who presents to clinic for establishment of care and evaluation of chest pain.    Chest pain:  -Symptoms are somewhat atypical but have been persistent  -EKG with right bundle branch block and some nonspecific ST changes.  -In light of his symptoms and significant family history of early MI.  We will plan for echo and gated CTA of his coronaries to rule out obstructive coronary disease and any anomalous circulation.    Hyperlipidemia:  -LDL borderline  elevated in the high 120s.  Will await pharmacologic therapy based on upcoming CTA    The patient's previous laboratory and cardiac diagnostic testing listed above has been reviewed and was utilized to establish my current plan of care.  Previous outside notes were reviewed and taken into consideration when deciding further treatment.    I personally reviewed: EKG    Discussed with: Patient and wife    Return to Clinic: Return in about 4 weeks (around 8/18/2023). Patient instructed to have all ordered testing prior to follow-up visit.     Orders Placed During This Encounter:  Orders Placed This Encounter   • CTA CORONARY ARTERIES   • 2D Echo With Doppler & Color Flow        Thank you for allowing me to see this patient in our office. Please call our office with any questions. Correspondence will be sent to your office.    Andrea Lepe M.D., F.A.C.C.  [facility]  [facility] [place]

## 2024-08-14 ENCOUNTER — HOSPITAL ENCOUNTER (OUTPATIENT)
Dept: CARDIOLOGY | Facility: HOSPITAL | Age: 79
Discharge: HOME | End: 2024-08-14
Payer: MEDICARE

## 2024-08-14 DIAGNOSIS — I48.11 LONGSTANDING PERSISTENT ATRIAL FIBRILLATION (MULTI): ICD-10-CM

## 2024-08-14 DIAGNOSIS — R60.0 LOCALIZED EDEMA: ICD-10-CM

## 2024-08-14 LAB
AORTIC VALVE MEAN GRADIENT: 4 MMHG
AORTIC VALVE PEAK VELOCITY: 1.36 M/S
AV PEAK GRADIENT: 7.4 MMHG
AVA (PEAK VEL): 1.54 CM2
AVA (VTI): 1.55 CM2
EJECTION FRACTION APICAL 4 CHAMBER: 52.9
EJECTION FRACTION: 55 %
LEFT ATRIUM VOLUME AREA LENGTH INDEX BSA: 51.8 ML/M2
LEFT VENTRICLE INTERNAL DIMENSION DIASTOLE: 3.73 CM (ref 3.5–6)
LEFT VENTRICULAR OUTFLOW TRACT DIAMETER: 1.6 CM
LV EJECTION FRACTION BIPLANE: 57 %
RIGHT VENTRICLE PEAK SYSTOLIC PRESSURE: 51.2 MMHG
TRICUSPID ANNULAR PLANE SYSTOLIC EXCURSION: 1.2 CM

## 2024-08-14 PROCEDURE — 93306 TTE W/DOPPLER COMPLETE: CPT

## 2024-08-14 PROCEDURE — 93306 TTE W/DOPPLER COMPLETE: CPT | Performed by: STUDENT IN AN ORGANIZED HEALTH CARE EDUCATION/TRAINING PROGRAM

## 2024-08-15 NOTE — RESULT ENCOUNTER NOTE
Let her know she has somewhat decreased heart function and higher pressure needed to push the blood into the lungs. So I would like for her to see Dr Coley soon, rather than waiting till her November appointment. This may explain her swelling

## 2024-08-19 RX ORDER — FUROSEMIDE 20 MG/1
20 TABLET ORAL
COMMUNITY
Start: 2024-08-19 | End: 2025-08-19

## 2024-09-10 PROBLEM — I83.90 VARICOSE VEINS OF LOWER EXTREMITY: Status: ACTIVE | Noted: 2024-09-10

## 2024-09-17 PROBLEM — E11.319 DIABETIC RETINOPATHY ASSOCIATED WITH TYPE 2 DIABETES MELLITUS (MULTI): Status: ACTIVE | Noted: 2024-09-17

## 2024-10-24 ENCOUNTER — TELEPHONE (OUTPATIENT)
Dept: PRIMARY CARE | Facility: CLINIC | Age: 79
End: 2024-10-24
Payer: MEDICARE

## 2024-10-24 DIAGNOSIS — I48.11 LONGSTANDING PERSISTENT ATRIAL FIBRILLATION (MULTI): Primary | ICD-10-CM

## 2024-10-24 NOTE — TELEPHONE ENCOUNTER
SHE IS BEING DC'D FROM [facility] TODAY AND NEEDS A REFERRAL FOR COUMADIN. THANK YOU.

## 2024-10-28 ENCOUNTER — ANTICOAGULATION - WARFARIN VISIT (OUTPATIENT)
Dept: PHARMACY | Facility: HOSPITAL | Age: 79
End: 2024-10-28
Payer: MEDICARE

## 2024-10-28 DIAGNOSIS — I48.11 LONGSTANDING PERSISTENT ATRIAL FIBRILLATION (MULTI): Primary | ICD-10-CM

## 2024-10-28 DIAGNOSIS — I26.94 MULTIPLE SUBSEGMENTAL PULMONARY EMBOLI WITHOUT ACUTE COR PULMONALE: ICD-10-CM

## 2024-10-28 LAB
POC INR: 3.4
POC PROTHROMBIN TIME: NORMAL

## 2024-10-28 PROCEDURE — 85610 PROTHROMBIN TIME: CPT | Mod: QW

## 2024-10-28 PROCEDURE — 99211 OFF/OP EST MAY X REQ PHY/QHP: CPT | Performed by: PHARMACIST

## 2024-10-29 ENCOUNTER — PATIENT OUTREACH (OUTPATIENT)
Dept: PRIMARY CARE | Facility: CLINIC | Age: 79
End: 2024-10-29
Payer: MEDICARE

## 2024-10-30 ENCOUNTER — ANTICOAGULATION - WARFARIN VISIT (OUTPATIENT)
Dept: PHARMACY | Facility: HOSPITAL | Age: 79
End: 2024-10-30
Payer: MEDICARE

## 2024-10-30 DIAGNOSIS — I48.11 LONGSTANDING PERSISTENT ATRIAL FIBRILLATION (MULTI): Primary | ICD-10-CM

## 2024-10-30 LAB
POC INR: 3
POC PROTHROMBIN TIME: NORMAL

## 2024-10-30 PROCEDURE — 99211 OFF/OP EST MAY X REQ PHY/QHP: CPT | Performed by: PHARMACIST

## 2024-10-30 PROCEDURE — 85610 PROTHROMBIN TIME: CPT | Mod: QW

## 2024-11-01 ENCOUNTER — OFFICE VISIT (OUTPATIENT)
Dept: PRIMARY CARE | Facility: CLINIC | Age: 79
End: 2024-11-01
Payer: MEDICARE

## 2024-11-01 VITALS
SYSTOLIC BLOOD PRESSURE: 122 MMHG | BODY MASS INDEX: 29.74 KG/M2 | OXYGEN SATURATION: 94 % | DIASTOLIC BLOOD PRESSURE: 60 MMHG | WEIGHT: 160 LBS | HEART RATE: 67 BPM

## 2024-11-01 DIAGNOSIS — E11.21 DIABETIC NEPHROPATHY ASSOCIATED WITH TYPE 2 DIABETES MELLITUS (MULTI): ICD-10-CM

## 2024-11-01 DIAGNOSIS — E66.3 OVERWEIGHT WITH BODY MASS INDEX (BMI) OF 29 TO 29.9 IN ADULT: ICD-10-CM

## 2024-11-01 DIAGNOSIS — Z09 HOSPITAL DISCHARGE FOLLOW-UP: Primary | ICD-10-CM

## 2024-11-01 DIAGNOSIS — N18.31 STAGE 3A CHRONIC KIDNEY DISEASE (MULTI): ICD-10-CM

## 2024-11-01 DIAGNOSIS — K21.9 GASTROESOPHAGEAL REFLUX DISEASE WITHOUT ESOPHAGITIS: ICD-10-CM

## 2024-11-01 DIAGNOSIS — I10 PRIMARY HYPERTENSION: ICD-10-CM

## 2024-11-01 DIAGNOSIS — E11.51 DIABETES MELLITUS TYPE 2 WITH PERIPHERAL ARTERY DISEASE: ICD-10-CM

## 2024-11-01 DIAGNOSIS — I48.11 LONGSTANDING PERSISTENT ATRIAL FIBRILLATION (MULTI): ICD-10-CM

## 2024-11-01 DIAGNOSIS — J45.20 MILD INTERMITTENT ASTHMA WITHOUT COMPLICATION (HHS-HCC): ICD-10-CM

## 2024-11-01 DIAGNOSIS — E78.00 HYPERCHOLESTEROLEMIA: ICD-10-CM

## 2024-11-01 RX ORDER — CYANOCOBALAMIN (VITAMIN B-12) 500 MCG
400 TABLET ORAL DAILY
COMMUNITY

## 2024-11-01 RX ORDER — FERROUS SULFATE, DRIED 160(50) MG
1 TABLET, EXTENDED RELEASE ORAL 2 TIMES DAILY
COMMUNITY

## 2024-11-01 RX ORDER — AMIODARONE HYDROCHLORIDE 200 MG/1
TABLET ORAL
Qty: 45 TABLET | Refills: 5 | Status: SHIPPED | OUTPATIENT
Start: 2024-11-01

## 2024-11-01 RX ORDER — WARFARIN SODIUM 5 MG/1
TABLET ORAL
Qty: 30 TABLET | Refills: 5 | Status: SHIPPED | OUTPATIENT
Start: 2024-11-01

## 2024-11-04 ENCOUNTER — ANTICOAGULATION - WARFARIN VISIT (OUTPATIENT)
Dept: PHARMACY | Facility: HOSPITAL | Age: 79
End: 2024-11-04
Payer: MEDICARE

## 2024-11-04 DIAGNOSIS — I48.11 LONGSTANDING PERSISTENT ATRIAL FIBRILLATION (MULTI): Primary | ICD-10-CM

## 2024-11-04 LAB
POC INR: 6.2
POC PROTHROMBIN TIME: NORMAL

## 2024-11-04 PROCEDURE — 99211 OFF/OP EST MAY X REQ PHY/QHP: CPT | Performed by: PHARMACIST

## 2024-11-04 PROCEDURE — 85610 PROTHROMBIN TIME: CPT | Mod: QW

## 2024-11-04 NOTE — PROGRESS NOTES
Pt enrolled in [facility] for management of Longstanding persistent atrial fibrillation (Multi) [I48.11].     Pt current location in clinic.     Current anticoagulant: Warfarin    Time since last visit: 5 days    Last INR: 3.0 on warfarin 32.5 mg in the previous week. Pt was instructed to continue with 5 mg daily at last visit.    Last Creatinine:   Lab Results   Component Value Date    CREATININE 1.11 (H) 07/31/2024     Last hemoglobin/hematocrit:  Lab Results   Component Value Date    HGB 12.1 07/31/2024     Lab Results   Component Value Date    HCT 39.5 07/31/2024       Current INR: 6.2 is SUPRAtherapeutic for goal range of 2.0-3.0 and is reflective of 35 mg in the previous week prior to visit.    Dosing confirmed with patient  Patient denies any missed doses.  Patient denies any medication changes, diet changes, or OTC/herbal supplement changes since last visit.  Patient denies any adverse reactions or barriers.  Patient denies any CP/SOB, fatigue, bleeding or bruising since last visit.   Patient denies any change in alcohol or tobacco use since last visit.   Patient denies any upcoming medical or dental procedures.    Plan:  Patient was instructed to  hold x3, then 2.5 mg x1, then recheck .  INR follow up will occur in 4 days.  Patient was instructed to maintain consistent vegetable intake, to monitor for any bruising or bleeding, and to call with any medication changes or concerns.    Pt handout given with above information    Oleg Cook, TatoD  P:[phone]  F:[phone]

## 2024-11-08 ENCOUNTER — ANTICOAGULATION - WARFARIN VISIT (OUTPATIENT)
Dept: PHARMACY | Facility: HOSPITAL | Age: 79
End: 2024-11-08
Payer: MEDICARE

## 2024-11-08 DIAGNOSIS — E11.51 DIABETES MELLITUS TYPE 2 WITH PERIPHERAL ARTERY DISEASE: ICD-10-CM

## 2024-11-08 DIAGNOSIS — I48.11 LONGSTANDING PERSISTENT ATRIAL FIBRILLATION (MULTI): Primary | ICD-10-CM

## 2024-11-08 LAB
POC INR: 1.5
POC PROTHROMBIN TIME: NORMAL

## 2024-11-08 PROCEDURE — 99211 OFF/OP EST MAY X REQ PHY/QHP: CPT

## 2024-11-08 PROCEDURE — 85610 PROTHROMBIN TIME: CPT | Mod: QW

## 2024-11-08 NOTE — PROGRESS NOTES
Pt enrolled in [facility] for management of Longstanding persistent atrial fibrillation (Multi) [I48.11].     Pt current location in clinic.     Current anticoagulant: Warfarin    Time since last visit: 4 days    Last INR: 6.2 on warfarin 32,5 mg in the previous week. No changes were made at that time.    Last Creatinine:   Lab Results   Component Value Date    CREATININE 1.11 (H) 07/31/2024     Last hemoglobin/hematocrit:  Lab Results   Component Value Date    HGB 12.1 07/31/2024     Lab Results   Component Value Date    HCT 39.5 07/31/2024       Current INR: 1.5 is SUBtherapeutic for goal range of 2.0-3.0 and is reflective of 17.5 mg in the previous week prior to visit.    Dosing confirmed with patient  Patient denies any missed doses.  Patient denies any medication changes, diet changes, or OTC/herbal supplement changes since last visit.  Patient denies any adverse reactions or barriers.  Patient denies any CP/SOB, fatigue, bleeding or bruising since last visit.   Patient denies any change in alcohol or tobacco use since last visit.   Patient denies any upcoming medical or dental procedures.    Plan:  Patient was instructed to continue with current warfarin dose.  INR follow up will occur in 1 weeks.  Patient was instructed to maintain consistent vegetable intake, to monitor for any bruising or bleeding, and to call with any medication changes or concerns.    Pt handout given with above information    Medhat Garcia, PharmD  P:[phone]  F:[phone]

## 2024-11-11 ENCOUNTER — ANTICOAGULATION - WARFARIN VISIT (OUTPATIENT)
Dept: PHARMACY | Facility: HOSPITAL | Age: 79
End: 2024-11-11
Payer: MEDICARE

## 2024-11-11 ENCOUNTER — APPOINTMENT (OUTPATIENT)
Dept: PRIMARY CARE | Facility: CLINIC | Age: 79
End: 2024-11-11
Payer: MEDICARE

## 2024-11-11 DIAGNOSIS — I48.11 LONGSTANDING PERSISTENT ATRIAL FIBRILLATION (MULTI): Primary | ICD-10-CM

## 2024-11-11 LAB
POC INR: 2.7
POC PROTHROMBIN TIME: NORMAL

## 2024-11-11 PROCEDURE — 99211 OFF/OP EST MAY X REQ PHY/QHP: CPT

## 2024-11-11 PROCEDURE — 85610 PROTHROMBIN TIME: CPT | Mod: QW

## 2024-11-11 NOTE — PROGRESS NOTES
Pt enrolled in [facility] for management of Longstanding persistent atrial fibrillation (Multi) [I48.11].     Pt current location in clinic.     Current anticoagulant: Warfarin    Time since last visit: 3 days    Last INR: 1.5 on warfarin 17.5 mg in the previous week. Pt was instructed to 5 mg daily at last visit.    Last Creatinine:   Lab Results   Component Value Date    CREATININE 1.11 (H) 07/31/2024     Last hemoglobin/hematocrit:  Lab Results   Component Value Date    HGB 12.1 07/31/2024     Lab Results   Component Value Date    HCT 39.5 07/31/2024       Current INR: 2.7 is therapeutic for goal range of 2.0-3.0 and is reflective of 32.5 mg in the previous week prior to visit.    Dosing confirmed with patient  Patient denies any missed doses.  Patient denies any medication changes, diet changes, or OTC/herbal supplement changes since last visit.  Patient denies any adverse reactions or barriers.  Patient denies any CP/SOB, fatigue, bleeding or bruising since last visit.   Patient denies any change in alcohol or tobacco use since last visit.   Patient denies any upcoming medical or dental procedures.    Patient did state that she had a fall and denies any head injury. States hit shoulder and hip.    Plan:  Patient was instructed to  2.5 mg on MWF and 5 mg AOD .  INR follow up will occur in 1 weeks.  Patient was instructed to maintain consistent vegetable intake, to monitor for any bruising or bleeding, and to call with any medication changes or concerns.    Pt handout given with above information    Tato PuenteD  P:[phone]  F:[phone]

## 2024-11-12 RX ORDER — METFORMIN HYDROCHLORIDE 500 MG/1
500 TABLET, EXTENDED RELEASE ORAL 2 TIMES DAILY
Qty: 180 TABLET | Refills: 0 | OUTPATIENT
Start: 2024-11-12

## 2024-11-14 ENCOUNTER — PATIENT OUTREACH (OUTPATIENT)
Dept: PRIMARY CARE | Facility: CLINIC | Age: 79
End: 2024-11-14
Payer: MEDICARE

## 2024-11-18 ENCOUNTER — ANTICOAGULATION - WARFARIN VISIT (OUTPATIENT)
Dept: PHARMACY | Facility: HOSPITAL | Age: 79
End: 2024-11-18
Payer: MEDICARE

## 2024-11-18 DIAGNOSIS — I48.11 LONGSTANDING PERSISTENT ATRIAL FIBRILLATION (MULTI): Primary | ICD-10-CM

## 2024-11-18 LAB
POC INR: 5.1
POC PROTHROMBIN TIME: NORMAL

## 2024-11-18 PROCEDURE — 99211 OFF/OP EST MAY X REQ PHY/QHP: CPT

## 2024-11-18 PROCEDURE — 85610 PROTHROMBIN TIME: CPT | Mod: QW

## 2024-11-18 NOTE — PROGRESS NOTES
Pt enrolled in [facility] for management of Longstanding persistent atrial fibrillation (Multi) [I48.11].     Pt current location in clinic.     Current anticoagulant: Warfarin    Time since last visit: 1 weeks    Last INR: 2.7 on warfarin 17.5 mg in the previous week. Pt was instructed to Take 5 mg x3 then come back for reevaluation at last visit.    Last Creatinine:   Lab Results   Component Value Date    CREATININE 1.11 (H) 07/31/2024     Last hemoglobin/hematocrit:  Lab Results   Component Value Date    HGB 12.1 07/31/2024     Lab Results   Component Value Date    HCT 39.5 07/31/2024       Current INR: 5.1 is SUPRAtherapeutic for goal range of 2.0-3.0 and is reflective of 27.5 mg in the previous week prior to visit.    Dosing confirmed with patient  Patient denies any missed doses.  Has been having for greens past few days.  Patient denies any adverse reactions or barriers.  Patient denies any CP/SOB, fatigue, bleeding or bruising since last visit.   Patient denies any change in alcohol or tobacco use since last visit.   Patient denies any upcoming medical or dental procedures.    Plan:  Patient was instructed to  Hold x2 then resume home dose  .  INR follow up will occur in 1 weeks.  Patient was instructed to maintain consistent vegetable intake, to monitor for any bruising or bleeding, and to call with any medication changes or concerns.    Pt handout given with above information    Medhat Garcia, PharmD  P:[phone]  F:[phone]

## 2024-11-22 ENCOUNTER — ANTICOAGULATION - WARFARIN VISIT (OUTPATIENT)
Dept: PHARMACY | Facility: HOSPITAL | Age: 79
End: 2024-11-22
Payer: MEDICARE

## 2024-11-22 DIAGNOSIS — I48.11 LONGSTANDING PERSISTENT ATRIAL FIBRILLATION (MULTI): Primary | ICD-10-CM

## 2024-11-22 LAB
POC INR: 3
POC PROTHROMBIN TIME: NORMAL

## 2024-11-22 PROCEDURE — 85610 PROTHROMBIN TIME: CPT | Mod: QW

## 2024-11-22 PROCEDURE — 99211 OFF/OP EST MAY X REQ PHY/QHP: CPT

## 2024-11-22 NOTE — PROGRESS NOTES
Pt enrolled in [facility] for management of Longstanding persistent atrial fibrillation (Multi) [I48.11].     Pt current location in clinic.     Current anticoagulant: Warfarin    Time since last visit: 4 days    Last INR: 5.1 on warfarin 27.5 mg in the previous week. Pt was instructed to Hold for 2 days then resume at last visit.    Last Creatinine:   Lab Results   Component Value Date    CREATININE 1.11 (H) 07/31/2024     Last hemoglobin/hematocrit:  Lab Results   Component Value Date    HGB 12.1 07/31/2024     Lab Results   Component Value Date    HCT 39.5 07/31/2024       Current INR: 3.0 is therapeutic for goal range of 2.0-3.0 and is reflective of 20 mg in the previous week prior to visit.    Dosing confirmed with patient  Patient denies any missed doses.  Did have broccoli soup to help INR come down  Patient denies any adverse reactions or barriers.  Patient denies any CP/SOB, fatigue, bleeding or bruising since last visit.   Patient denies any change in alcohol or tobacco use since last visit.   Patient denies any upcoming medical or dental procedures.    Plan:  Patient was instructed to continue with current warfarin dose.  INR follow up will occur in 2 weeks.  Patient was instructed to maintain consistent vegetable intake, to monitor for any bruising or bleeding, and to call with any medication changes or concerns.    Pt handout given with above information    Medhat Garcia, PharmD  P:[phone]  F:[phone]

## 2024-12-02 ENCOUNTER — ANTICOAGULATION - WARFARIN VISIT (OUTPATIENT)
Dept: PHARMACY | Facility: HOSPITAL | Age: 79
End: 2024-12-02
Payer: MEDICARE

## 2024-12-02 DIAGNOSIS — I48.11 LONGSTANDING PERSISTENT ATRIAL FIBRILLATION (MULTI): Primary | ICD-10-CM

## 2024-12-02 LAB
POC INR: 3.9
POC PROTHROMBIN TIME: NORMAL

## 2024-12-02 PROCEDURE — 99211 OFF/OP EST MAY X REQ PHY/QHP: CPT | Performed by: PHARMACIST

## 2024-12-02 PROCEDURE — 85610 PROTHROMBIN TIME: CPT | Mod: QW

## 2024-12-02 NOTE — PROGRESS NOTES
Pt enrolled in [facility] for management of Longstanding persistent atrial fibrillation (Multi) [I48.11].     Pt current location in clinic.     Current anticoagulant: Warfarin    Time since last visit: 1.5 days    Last INR: 3.0 on warfarin 20 mg in the previous week. Pt was instructed to restart at 25 mg weekly at last visit.    Last Creatinine:   Lab Results   Component Value Date    CREATININE 1.11 (H) 07/31/2024     Last hemoglobin/hematocrit:  Lab Results   Component Value Date    HGB 12.1 07/31/2024     Lab Results   Component Value Date    HCT 39.5 07/31/2024       Current INR: 3.9 is SUPRAtherapeutic for goal range of 2.0-3.0 and is reflective of 25 mg in the previous week prior to visit.    Dosing confirmed with patient  Patient denies any missed doses.  Patient denies any medication changes, diet changes, or OTC/herbal supplement changes since last visit.  Patient denies any adverse reactions or barriers.  Patient denies any CP/SOB, fatigue, bleeding or bruising since last visit.   Patient denies any change in alcohol or tobacco use since last visit.   She is to have ablation on 1/6, will need weekly INRs until then    Plan:  Patient was instructed to hold x1, then start 22.5mg weekly.  INR follow up will occur in 1.5 weeks.  Patient was instructed to maintain consistent vegetable intake, to monitor for any bruising or bleeding, and to call with any medication changes or concerns.    Pt handout given with above information    Oleg Cook, PharmD  P:[phone]  F:[phone]

## 2024-12-03 ENCOUNTER — LAB (OUTPATIENT)
Dept: LAB | Facility: LAB | Age: 79
End: 2024-12-03
Payer: MEDICARE

## 2024-12-03 ENCOUNTER — APPOINTMENT (OUTPATIENT)
Dept: PHARMACY | Facility: HOSPITAL | Age: 79
End: 2024-12-03
Payer: MEDICARE

## 2024-12-03 ENCOUNTER — APPOINTMENT (OUTPATIENT)
Dept: PRIMARY CARE | Facility: CLINIC | Age: 79
End: 2024-12-03
Payer: MEDICARE

## 2024-12-03 VITALS
BODY MASS INDEX: 29.74 KG/M2 | DIASTOLIC BLOOD PRESSURE: 82 MMHG | SYSTOLIC BLOOD PRESSURE: 136 MMHG | WEIGHT: 160 LBS | OXYGEN SATURATION: 98 % | HEART RATE: 77 BPM

## 2024-12-03 DIAGNOSIS — Z09 HOSPITAL DISCHARGE FOLLOW-UP: ICD-10-CM

## 2024-12-03 DIAGNOSIS — E83.42 HYPOMAGNESEMIA: ICD-10-CM

## 2024-12-03 DIAGNOSIS — E11.51 DIABETES MELLITUS TYPE 2 WITH PERIPHERAL ARTERY DISEASE: ICD-10-CM

## 2024-12-03 DIAGNOSIS — J20.9 ACUTE BRONCHITIS, UNSPECIFIED ORGANISM: ICD-10-CM

## 2024-12-03 DIAGNOSIS — J45.20 MILD INTERMITTENT ASTHMA WITHOUT COMPLICATION (HHS-HCC): ICD-10-CM

## 2024-12-03 DIAGNOSIS — K21.9 GASTROESOPHAGEAL REFLUX DISEASE WITHOUT ESOPHAGITIS: ICD-10-CM

## 2024-12-03 DIAGNOSIS — E83.42 HYPOMAGNESEMIA: Primary | ICD-10-CM

## 2024-12-03 DIAGNOSIS — I50.32 CHRONIC DIASTOLIC CONGESTIVE HEART FAILURE: ICD-10-CM

## 2024-12-03 DIAGNOSIS — I10 PRIMARY HYPERTENSION: ICD-10-CM

## 2024-12-03 DIAGNOSIS — E78.00 HYPERCHOLESTEROLEMIA: ICD-10-CM

## 2024-12-03 DIAGNOSIS — I48.11 LONGSTANDING PERSISTENT ATRIAL FIBRILLATION (MULTI): ICD-10-CM

## 2024-12-03 DIAGNOSIS — E11.21 DIABETIC NEPHROPATHY ASSOCIATED WITH TYPE 2 DIABETES MELLITUS (MULTI): ICD-10-CM

## 2024-12-03 PROBLEM — I25.119 ATHEROSCLEROTIC HEART DISEASE OF NATIVE CORONARY ARTERY WITH UNSPECIFIED ANGINA PECTORIS: Status: ACTIVE | Noted: 2024-12-03

## 2024-12-03 LAB
ALBUMIN SERPL BCP-MCNC: 4.3 G/DL (ref 3.4–5)
ALP SERPL-CCNC: 67 U/L (ref 33–136)
ALT SERPL W P-5'-P-CCNC: 16 U/L (ref 7–45)
ANION GAP SERPL CALC-SCNC: 12 MMOL/L (ref 10–20)
AST SERPL W P-5'-P-CCNC: 16 U/L (ref 9–39)
BILIRUB SERPL-MCNC: 0.6 MG/DL (ref 0–1.2)
BUN SERPL-MCNC: 17 MG/DL (ref 6–23)
CALCIUM SERPL-MCNC: 9.5 MG/DL (ref 8.6–10.3)
CHLORIDE SERPL-SCNC: 99 MMOL/L (ref 98–107)
CO2 SERPL-SCNC: 30 MMOL/L (ref 21–32)
CREAT SERPL-MCNC: 0.96 MG/DL (ref 0.5–1.05)
EGFRCR SERPLBLD CKD-EPI 2021: 60 ML/MIN/1.73M*2
ERYTHROCYTE [DISTWIDTH] IN BLOOD BY AUTOMATED COUNT: 15.6 % (ref 11.5–14.5)
GLUCOSE SERPL-MCNC: 89 MG/DL (ref 74–99)
HCT VFR BLD AUTO: 43.1 % (ref 36–46)
HGB BLD-MCNC: 13.4 G/DL (ref 12–16)
MAGNESIUM SERPL-MCNC: 1.61 MG/DL (ref 1.6–2.4)
MCH RBC QN AUTO: 25.4 PG (ref 26–34)
MCHC RBC AUTO-ENTMCNC: 31.1 G/DL (ref 32–36)
MCV RBC AUTO: 82 FL (ref 80–100)
NRBC BLD-RTO: 0 /100 WBCS (ref 0–0)
PLATELET # BLD AUTO: 309 X10*3/UL (ref 150–450)
POTASSIUM SERPL-SCNC: 3.7 MMOL/L (ref 3.5–5.3)
PROT SERPL-MCNC: 7.3 G/DL (ref 6.4–8.2)
RBC # BLD AUTO: 5.27 X10*6/UL (ref 4–5.2)
SODIUM SERPL-SCNC: 137 MMOL/L (ref 136–145)
WBC # BLD AUTO: 9.4 X10*3/UL (ref 4.4–11.3)

## 2024-12-03 PROCEDURE — 1159F MED LIST DOCD IN RCRD: CPT | Performed by: FAMILY MEDICINE

## 2024-12-03 PROCEDURE — G2211 COMPLEX E/M VISIT ADD ON: HCPCS | Performed by: FAMILY MEDICINE

## 2024-12-03 PROCEDURE — 3075F SYST BP GE 130 - 139MM HG: CPT | Performed by: FAMILY MEDICINE

## 2024-12-03 PROCEDURE — 1036F TOBACCO NON-USER: CPT | Performed by: FAMILY MEDICINE

## 2024-12-03 PROCEDURE — 3079F DIAST BP 80-89 MM HG: CPT | Performed by: FAMILY MEDICINE

## 2024-12-03 PROCEDURE — 85027 COMPLETE CBC AUTOMATED: CPT

## 2024-12-03 PROCEDURE — 36415 COLL VENOUS BLD VENIPUNCTURE: CPT

## 2024-12-03 PROCEDURE — 80053 COMPREHEN METABOLIC PANEL: CPT

## 2024-12-03 PROCEDURE — 99214 OFFICE O/P EST MOD 30 MIN: CPT | Performed by: FAMILY MEDICINE

## 2024-12-03 PROCEDURE — 83735 ASSAY OF MAGNESIUM: CPT

## 2024-12-03 RX ORDER — AMLODIPINE BESYLATE 10 MG/1
10 TABLET ORAL DAILY
Qty: 90 TABLET | Refills: 3 | Status: SHIPPED | OUTPATIENT
Start: 2024-12-03 | End: 2025-12-03

## 2024-12-03 RX ORDER — GABAPENTIN 600 MG/1
600 TABLET ORAL 3 TIMES DAILY
Qty: 270 TABLET | Refills: 3 | Status: SHIPPED | OUTPATIENT
Start: 2024-12-03 | End: 2025-12-03

## 2024-12-03 RX ORDER — ATORVASTATIN CALCIUM 40 MG/1
40 TABLET, FILM COATED ORAL NIGHTLY
Qty: 90 TABLET | Refills: 3 | Status: SHIPPED | OUTPATIENT
Start: 2024-12-03

## 2024-12-03 RX ORDER — METFORMIN HYDROCHLORIDE 500 MG/1
500 TABLET, EXTENDED RELEASE ORAL 2 TIMES DAILY
Qty: 180 TABLET | Refills: 3 | Status: SHIPPED | OUTPATIENT
Start: 2024-12-03 | End: 2025-12-03

## 2024-12-03 NOTE — PROGRESS NOTES
Subjective   Patient ID: Tish Linton is a 79 y.o. female who presents for Follow-up.    HPI   11/1/24 Admit to [facility] 10/21-25/24 with summary as follows: Prior to admission was found to Afib and then had cardioversion.  And then admitted for Sotalol.   11/1/24 Updates in bold and 12/3/24 updates at bottom     Tish Linton is a 79 y.o. female patient of Cy Walters MD with history of afib, NIDDM2, PVD, asthma, GERD who presented to [facility] on 10/21/2024 for antiarrhythmic medication loading.    Afib  Tiny PE  Pt with increased BLE swelling, dyspnea on exertion for past several months  S/p DCCV on 9/18/2024  Pt denies shortness of breath, chest pain, palpitations on exam  Admitted for sotalol loading  A-fib with controlled rate of 87  continue cardiac monitoring  EP consult, monitoring failed Tikosyn and sotalol  Started amiodarone for bridging, till cardiology is able to schedule OP A-fib ablation  CT HUMZA on 10/23/24 reviewed and showed tiny pulmonary emboli in the LEYLA and LLL, very small clot burden, no HUMZA thrombus  started on warfarin pharmacy to dose with Lovenox bridging  monitoring of the LFTs and Coumadin dose while the patient on amiodarone,  Referral to Coumadin clinic was sent in by EP  Will discharge the patient on Lovenox bridging     Continues on Amiodarone and Warfarin.       Acute on chronic diastolic CHF present on admission, acute component resolved  Acute hypoxic respiratory failure was present on admission and is now resolved  Pulmonary hypertension, Hypertension  Tricuspid regurg  Echo 3/2024 EF 66%, indeterminate diastolic function   Patient received total of 40 mg IV furosemide overnight due to fluid overload  Today patient slightly euvolemic on examination, on room air  Echo was done on 9/22/24 which showed LVEF of 55%, mild TR, pulmonary hypertension 43.. Has bilateral atrial dilatation, moderate mitral  regurg  Continue home furosemide  BNP 2800 on  admission  Strict I's and O's, daily weights, fluid restriction  Patient qualified for home oxygen, be discharged with home oxygen 2 L     Edema is better. No orthopnea. Still on oxygen.  When off here it dropped to 84%. Watching fluids.  10/25/24 CMP looks good with GFR 66. CBC with hemoglobin 11.5. She did have a CT of the chest due to Lymph nodes in mediastinum. Largest 12.5 CM.  So will see what Dr Coley does but may need 1 month follow up    NIDDM2  A1c 2021 6.8  Pt on home metformin, hold while inpatient  SSI  Keep BG between 140 and 180     Has been running in low to mid 100s. Back on metformin    Asthma  Continue PRN albuterol, montelukast  Will discharge on home oxygen     Occasional albuterol.  Still on Singulair.. no wheezing     HTN  HLD  PVD  Continue amlodipine, statin, lisinopril      Lipids good in May.     GERD  Continue PPI      Good on Nexium 20. Cannot skip and occasionally needs a second if spicy food.    12/3/24  To have an ablation on January 6. She is now with a URI of SN, RN, PND, cough mild. No fever. Family with bronchitis.  Still on Amiodarone with hope to go off of that when she has ablation.  She is on Coumadin with Cou[facility]in Clinic at [facility].      The edema with the chronic diastolic CHF is down in AM. She is able to lay flat at hs.  Oxygen still at hs.  Daytime has been good with Sats in 90s most of time. Can dorp to upper 80s but then back up if she takes deep breaths. Occasionally will pop on oxygen .    She is to have a PET for the large lymph node to see if any others and if suspicious. IgKappa high at 37.16.      Glucose is low to mid 100s. Metformin    Still occasional albuterol.  Has been good on Singulair.     Had low magnesium. She has combo pill including this     Dr Dubose has ordered PET scan, mammogram and Cologuard     Review of Systems    Objective   /82 (BP Location: Left arm, Patient Position: Sitting)   Pulse 77   Wt 72.6 kg (160 lb)   SpO2 98%   BMI  29.74 kg/m²     Physical Exam  Vitals reviewed.   Constitutional:       General: She is not in acute distress.     Appearance: Normal appearance. She is obese.   HENT:      Head: Normocephalic.      Right Ear: Tympanic membrane, ear canal and external ear normal.      Left Ear: Tympanic membrane, ear canal and external ear normal.      Nose: Nose normal.      Mouth/Throat:      Mouth: Mucous membranes are moist.      Pharynx: Oropharynx is clear.   Eyes:      Extraocular Movements: Extraocular movements intact.      Conjunctiva/sclera: Conjunctivae normal.      Pupils: Pupils are equal, round, and reactive to light.   Neck:      Vascular: No carotid bruit.   Cardiovascular:      Rate and Rhythm: Normal rate and regular rhythm.      Pulses: Normal pulses.      Heart sounds: Normal heart sounds. No murmur heard.     Comments: Cannot hear any irregularity today,   Pulmonary:      Effort: Pulmonary effort is normal. No respiratory distress.      Breath sounds: Normal breath sounds.   Abdominal:      General: Abdomen is flat. Bowel sounds are normal. There is no distension.      Palpations: Abdomen is soft. There is no mass.      Tenderness: There is no abdominal tenderness.   Musculoskeletal:      Cervical back: Normal range of motion and neck supple. No tenderness.   Lymphadenopathy:      Cervical: No cervical adenopathy.   Skin:     General: Skin is warm and dry.      Findings: No rash.   Neurological:      General: No focal deficit present.      Mental Status: She is alert and oriented to person, place, and time.   Psychiatric:         Mood and Affect: Mood normal.         Thought Content: Thought content normal.         Judgment: Judgment normal.         Assessment/Plan   Diagnoses and all orders for this visit:  Hypomagnesemia  -     Magnesium; Future  Hospital discharge follow-up  -     Follow Up In Primary Care - Established  Primary hypertension  -     amLODIPine (Norvasc) 10 mg tablet; Take 1 tablet (10 mg) by  mouth once daily.  Hypercholesterolemia  -     atorvastatin (Lipitor) 40 mg tablet; Take 1 tablet (40 mg) by mouth once daily at bedtime.  Diabetic nephropathy associated with type 2 diabetes mellitus (Multi)  -     gabapentin (Neurontin) 600 mg tablet; Take 1 tablet (600 mg) by mouth 3 times a day.  Diabetes mellitus type 2 with peripheral artery disease  -     metFORMIN  mg 24 hr tablet; Take 1 tablet (500 mg) by mouth 2 times a day.  -     Comprehensive Metabolic Panel; Future  Longstanding persistent atrial fibrillation (Multi)  Gastroesophageal reflux disease without esophagitis  Mild intermittent asthma without complication ([facility])  Acute bronchitis, unspecified organism  Chronic diastolic congestive heart failure  -     CBC; Future  Other orders  -     Follow Up In Primary Care - Established; Future  Cannot treat the URI as the amiodarone interacts with Zithromax and allergic to Amoxicillin and Doxycycline.. Could consider cephalosporin.

## 2024-12-13 ENCOUNTER — ANTICOAGULATION - WARFARIN VISIT (OUTPATIENT)
Dept: PHARMACY | Facility: HOSPITAL | Age: 79
End: 2024-12-13
Payer: MEDICARE

## 2024-12-13 ENCOUNTER — PATIENT OUTREACH (OUTPATIENT)
Dept: PRIMARY CARE | Facility: CLINIC | Age: 79
End: 2024-12-13
Payer: MEDICARE

## 2024-12-13 DIAGNOSIS — I48.11 LONGSTANDING PERSISTENT ATRIAL FIBRILLATION (MULTI): Primary | ICD-10-CM

## 2024-12-13 LAB
POC INR: 3.4
POC PROTHROMBIN TIME: NORMAL

## 2024-12-13 PROCEDURE — 99211 OFF/OP EST MAY X REQ PHY/QHP: CPT

## 2024-12-13 PROCEDURE — 85610 PROTHROMBIN TIME: CPT | Mod: QW

## 2024-12-13 NOTE — PROGRESS NOTES
Pt enrolled in [facility] for management of Longstanding persistent atrial fibrillation (Multi) [I48.11].     Pt current location in clinic.     Current anticoagulant: Warfarin    Time since last visit: 11 days    Last INR: 3.9 on warfarin 25 mg in the previous week. Pt was instructed to hold x 1 then resume current regimen at last visit.    Last Creatinine:   Lab Results   Component Value Date    CREATININE 0.96 12/03/2024     Last hemoglobin/hematocrit:  Lab Results   Component Value Date    HGB 13.4 12/03/2024     Lab Results   Component Value Date    HCT 43.1 12/03/2024       Current INR: 3.4 is SUPRAtherapeutic for goal range of 2.0-3.0 and is reflective of 20 mg in the previous week prior to visit.    Dosing confirmed with patient  Patient denies any missed doses.  Patient denies any medication changes, diet changes, or OTC/herbal supplement changes since last visit.  Patient reports eating less vegetables  Patient started Vitamin B12  Patient also takes B6  Patient denies any adverse reactions or barriers.  Patient denies any CP/SOB, fatigue, bleeding or bruising since last visit.   Patient denies any change in alcohol or tobacco use since last visit.   Patient denies any upcoming medical or dental procedures.  Patient is getting an ablation on 1/6 and will need weekly INRs    Plan:  Patient was instructed to  hold x 1 then change regimen to 5 mg every Thu; 2.5 mg all other days .  INR follow up will occur in 1 weeks.  Patient was instructed to maintain consistent vegetable intake, to monitor for any bruising or bleeding, and to call with any medication changes or concerns.    Pt handout given with above information    Winsome Robertson, PharmD  P:[phone]  F:[phone]

## 2024-12-16 ENCOUNTER — TELEPHONE (OUTPATIENT)
Dept: PRIMARY CARE | Facility: CLINIC | Age: 79
End: 2024-12-16
Payer: MEDICARE

## 2024-12-16 DIAGNOSIS — I48.11 LONGSTANDING PERSISTENT ATRIAL FIBRILLATION (MULTI): ICD-10-CM

## 2024-12-16 RX ORDER — AMIODARONE HYDROCHLORIDE 200 MG/1
200 TABLET ORAL DAILY
Qty: 30 TABLET | Refills: 11 | Status: SHIPPED | OUTPATIENT
Start: 2024-12-16 | End: 2025-12-16

## 2024-12-16 NOTE — TELEPHONE ENCOUNTER
Message from patient that when she was discharged from hospital, she was given a Rx for Amiodarone 200mg 2 daily then go to 1 daily.  She is taking the 1 daily dose now and didn't know if she was to continue it.  If so, she will need a new Rx    [facility]

## 2024-12-16 NOTE — TELEPHONE ENCOUNTER
Pc to patient and let her know Dr. Walters sent in Rx with refills and her cardiologist can adjust dose if needed.

## 2024-12-20 ENCOUNTER — ANTICOAGULATION - WARFARIN VISIT (OUTPATIENT)
Dept: PHARMACY | Facility: HOSPITAL | Age: 79
End: 2024-12-20
Payer: MEDICARE

## 2024-12-20 DIAGNOSIS — I48.11 LONGSTANDING PERSISTENT ATRIAL FIBRILLATION (MULTI): Primary | ICD-10-CM

## 2024-12-20 LAB
POC INR: 1.6
POC PROTHROMBIN TIME: NORMAL

## 2024-12-20 PROCEDURE — 99211 OFF/OP EST MAY X REQ PHY/QHP: CPT

## 2024-12-20 PROCEDURE — 85610 PROTHROMBIN TIME: CPT | Mod: QW

## 2024-12-20 NOTE — PROGRESS NOTES
Pt enrolled in [facility] for management of Longstanding persistent atrial fibrillation (Multi) [I48.11].     Pt current location in clinic.     Current anticoagulant: Warfarin    Time since last visit: 1 weeks    Last INR: 3.4 on warfarin 20 mg in the previous week. Pt was instructed to hold x 1 then resume normal regimen at last visit.    Last Creatinine:   Lab Results   Component Value Date    CREATININE 0.96 12/03/2024     Last hemoglobin/hematocrit:  Lab Results   Component Value Date    HGB 13.4 12/03/2024     Lab Results   Component Value Date    HCT 43.1 12/03/2024       Current INR: 1.6 is SUBtherapeutic for goal range of 2.0-3.0 and is reflective of 20 mg in the previous week prior to visit.    Dosing confirmed with patient  Patient denies any missed doses.  Patient denies any medication changes, diet changes, or OTC/herbal supplement changes since last visit.  Ate broccoli and cauliflower twice this week  Patient denies any adverse reactions or barriers.  Patient denies any CP/SOB, fatigue, bleeding or bruising since last visit.   Patient denies any change in alcohol or tobacco use since last visit.   Patient denies any upcoming medical or dental procedures.    Patient needs weekly INRs until her ablation on 01/03    Plan:  Patient was instructed to  take 5 mg today then resume current regimen of 5 mg every Thu; 2.5 mg all other days  .  INR follow up will occur in 1 weeks.  Patient was instructed to maintain consistent vegetable intake, to monitor for any bruising or bleeding, and to call with any medication changes or concerns.    Pt handout given with above information    Winsome Robertson, PharmD  P:[phone]  F:[phone]

## 2024-12-26 ENCOUNTER — ANTICOAGULATION - WARFARIN VISIT (OUTPATIENT)
Dept: PHARMACY | Facility: HOSPITAL | Age: 79
End: 2024-12-26
Payer: MEDICARE

## 2024-12-26 DIAGNOSIS — I48.11 LONGSTANDING PERSISTENT ATRIAL FIBRILLATION (MULTI): Primary | ICD-10-CM

## 2024-12-26 LAB
POC INR: 2.4
POC PROTHROMBIN TIME: NORMAL

## 2024-12-26 PROCEDURE — 99211 OFF/OP EST MAY X REQ PHY/QHP: CPT | Performed by: PHARMACIST

## 2024-12-26 PROCEDURE — 85610 PROTHROMBIN TIME: CPT | Mod: QW

## 2024-12-27 ENCOUNTER — APPOINTMENT (OUTPATIENT)
Dept: PHARMACY | Facility: HOSPITAL | Age: 79
End: 2024-12-27
Payer: MEDICARE

## 2024-12-31 ENCOUNTER — OFFICE VISIT (OUTPATIENT)
Dept: PRIMARY CARE | Facility: CLINIC | Age: 79
End: 2024-12-31
Payer: MEDICARE

## 2024-12-31 DIAGNOSIS — J01.90 ACUTE NON-RECURRENT SINUSITIS, UNSPECIFIED LOCATION: Primary | ICD-10-CM

## 2024-12-31 PROCEDURE — 1160F RVW MEDS BY RX/DR IN RCRD: CPT

## 2024-12-31 PROCEDURE — 1036F TOBACCO NON-USER: CPT

## 2024-12-31 PROCEDURE — 1159F MED LIST DOCD IN RCRD: CPT

## 2024-12-31 PROCEDURE — 99213 OFFICE O/P EST LOW 20 MIN: CPT

## 2024-12-31 RX ORDER — CEFDINIR 300 MG/1
300 CAPSULE ORAL 2 TIMES DAILY
Qty: 14 CAPSULE | Refills: 0 | Status: SHIPPED | OUTPATIENT
Start: 2024-12-31 | End: 2025-01-07

## 2024-12-31 ASSESSMENT — ENCOUNTER SYMPTOMS
CONSTIPATION: 0
DIAPHORESIS: 0
DIARRHEA: 0
WEAKNESS: 0
FREQUENCY: 0
NAUSEA: 0
NERVOUS/ANXIOUS: 0
WOUND: 0
ARTHRALGIAS: 0
UNEXPECTED WEIGHT CHANGE: 0
NUMBNESS: 0
MYALGIAS: 0
RHINORRHEA: 1
POLYPHAGIA: 0
RECTAL PAIN: 0
ABDOMINAL PAIN: 0
SHORTNESS OF BREATH: 0
HEADACHES: 0
SORE THROAT: 1
CHILLS: 0
SINUS PRESSURE: 1
DIFFICULTY URINATING: 0
FACIAL SWELLING: 0
POLYDIPSIA: 0
TROUBLE SWALLOWING: 0
PALPITATIONS: 0
FATIGUE: 0
CHEST TIGHTNESS: 0

## 2024-12-31 ASSESSMENT — PATIENT HEALTH QUESTIONNAIRE - PHQ9
SUM OF ALL RESPONSES TO PHQ9 QUESTIONS 1 AND 2: 0
1. LITTLE INTEREST OR PLEASURE IN DOING THINGS: NOT AT ALL
2. FEELING DOWN, DEPRESSED OR HOPELESS: NOT AT ALL

## 2024-12-31 NOTE — PROGRESS NOTES
Subjective   Patient ID: Tish Linton is a 79 y.o. female who presents for Sick (Pt is being seen for a sick visit. Reports congestion and coughing and spitting up mucus, reports she has different testing at the end of the week and beginning of next week. Is requesting a abx. Negative for COVID just took a home test, reports her daughter that she lives with does have COVID. Trying to stay away from her. ).    Virtual or Telephone Consent    A telephone visit (audio only) between the patient (at the originating site) and the provider (at the distant site) was utilized to provide this telehealth service.   Verbal consent was requested and obtained from Tish Linton on this date, 12/31/24 for a telehealth visit.       Patient calls for virtual visit (telephone) for upper respiratory/sinus infection.    Sinusitis: Patient has had multiple family members sick throughout the house over the last month.  She has started to have sinus pressure, headache, postnasal drip some sore throat in the morning.  No ear involvement.  Call for virtual evaluation due to upcoming holiday and wanting to get on antibiotic.  She is concerned that if she has an active infection in a week from now she will be unable to get her heart ablation performed.  She previously has seen her primary care provider about this and has tried over-the-counter methods with no improvement.  She was exposed to COVID but did a home test which was negative.  She has allergies to amoxicillin and doxycycline, is unable to take Zithromax or Levaquin due to interaction with amiodarone.  Has tolerated Omnicef in the past.  Only reaction to amoxicillin is rash.  Prescribed Omnicef for treatment, patient will discontinue use if she has any side effects and proceed to ER or call us.  No other complaints at this time.             Review of Systems   Constitutional:  Negative for chills, diaphoresis, fatigue and unexpected weight change.   HENT:  Positive for  postnasal drip, rhinorrhea, sinus pressure and sore throat. Negative for dental problem, ear discharge, ear pain, facial swelling, hearing loss, tinnitus and trouble swallowing.    Eyes:  Negative for visual disturbance.   Respiratory:  Negative for chest tightness and shortness of breath.    Cardiovascular:  Negative for chest pain, palpitations and leg swelling.   Gastrointestinal:  Negative for abdominal pain, constipation, diarrhea, nausea and rectal pain.   Endocrine: Negative for polydipsia, polyphagia and polyuria.   Genitourinary:  Negative for difficulty urinating, frequency and urgency.   Musculoskeletal:  Negative for arthralgias and myalgias.   Skin:  Negative for pallor and wound.   Neurological:  Negative for syncope, weakness, numbness and headaches.   Psychiatric/Behavioral:  Negative for suicidal ideas. The patient is not nervous/anxious.        Objective   There were no vitals taken for this visit.    Physical Exam  Neurological:      General: No focal deficit present.      Mental Status: She is alert and oriented to person, place, and time. Mental status is at baseline.   Psychiatric:         Mood and Affect: Mood normal.         Behavior: Behavior normal.         Thought Content: Thought content normal.         Judgment: Judgment normal.         Assessment/Plan   Diagnoses and all orders for this visit:  Acute non-recurrent sinusitis, unspecified location  -     cefdinir (Omnicef) 300 mg capsule; Take 1 capsule (300 mg) by mouth 2 times a day for 7 days.

## 2025-01-03 ENCOUNTER — APPOINTMENT (OUTPATIENT)
Dept: PHARMACY | Facility: HOSPITAL | Age: 80
End: 2025-01-03
Payer: MEDICARE

## 2025-01-06 ENCOUNTER — ANTICOAGULATION - WARFARIN VISIT (OUTPATIENT)
Dept: PHARMACY | Facility: HOSPITAL | Age: 80
End: 2025-01-06
Payer: MEDICARE

## 2025-01-06 DIAGNOSIS — I48.11 LONGSTANDING PERSISTENT ATRIAL FIBRILLATION (MULTI): Primary | ICD-10-CM

## 2025-01-06 LAB
POC INR: 2.2
POC PROTHROMBIN TIME: NORMAL

## 2025-01-06 PROCEDURE — 99211 OFF/OP EST MAY X REQ PHY/QHP: CPT | Performed by: PHARMACIST

## 2025-01-06 PROCEDURE — 85610 PROTHROMBIN TIME: CPT | Mod: QW

## 2025-01-06 NOTE — PROGRESS NOTES
Pt enrolled in [facility] for management of Longstanding persistent atrial fibrillation (Multi) [I48.11].     Pt current location in clinic.     Current anticoagulant: Warfarin    Time since last visit: 2 weeks    Last INR: 2.4 on warfarin 22.5 mg in the previous week. Pt was instructed to start 20mg weekly at last visit. Was in ED last week for COVID, A fib.    Last Creatinine:   Lab Results   Component Value Date    CREATININE 0.96 12/03/2024     Last hemoglobin/hematocrit:  Lab Results   Component Value Date    HGB 13.4 12/03/2024     Lab Results   Component Value Date    HCT 43.1 12/03/2024       Current INR: 2.2 is therapeutic for goal range of 2.0-3.0 and is reflective of 22.5 mg in the previous week prior to visit.    Dosing confirmed with patient - she notes that she was scheduled for appt last week but was sick, she then did not know what dose to take, so she took 5mg Friday.  Patient denies any missed doses.  Patient denies any diet changes, or OTC/herbal supplement changes since last visit - was started on cefdinir for URI/Covid last week. Has finished clinda for her toe infection. She has been taking occasional acetaminophen for headache related to her COVID.  Patient denies any adverse reactions or barriers.  Patient denies any CP/SOB, fatigue, bleeding or bruising since last visit.   Patient denies any change in alcohol or tobacco use since last visit.   Ablation postponed to 1/20, still needs weekly INRs, hoping for Friday INRs    Plan:  Patient was instructed to  continue with 22.5mg weekly .  INR follow up will occur in 4 days.  Patient was instructed to maintain consistent vegetable intake, to monitor for any bruising or bleeding, and to call with any medication changes or concerns.    Pt handout given with above information    Oleg Cook, TatoD  P:[phone]  F:[phone]

## 2025-01-10 ENCOUNTER — ANTICOAGULATION - WARFARIN VISIT (OUTPATIENT)
Dept: PHARMACY | Facility: HOSPITAL | Age: 80
End: 2025-01-10
Payer: MEDICARE

## 2025-01-10 DIAGNOSIS — I48.11 LONGSTANDING PERSISTENT ATRIAL FIBRILLATION (MULTI): Primary | ICD-10-CM

## 2025-01-10 LAB
POC INR: 3.5
POC PROTHROMBIN TIME: NORMAL

## 2025-01-10 PROCEDURE — 85610 PROTHROMBIN TIME: CPT | Mod: QW

## 2025-01-10 NOTE — PROGRESS NOTES
Pt enrolled in [facility] for management of Longstanding persistent atrial fibrillation (Multi) [I48.11].     Pt current location in clinic.     Current anticoagulant: Warfarin    Time since last visit: 4 days    Last INR: 2.2 on warfarin 22.5 mg in the previous week. Pt was instructed to take 5mf Mon, Thu, 2.5mg all other days until today at last visit.    Last Creatinine:   Lab Results   Component Value Date    CREATININE 0.96 12/03/2024     Last hemoglobin/hematocrit:  Lab Results   Component Value Date    HGB 13.4 12/03/2024     Lab Results   Component Value Date    HCT 43.1 12/03/2024       Current INR: 3.5 is SUPRAtherapeutic for goal range of 2.0-3.0 and is reflective of 22.5 mg in the previous week prior to visit.    Dosing confirmed with patient  Patient denies any missed doses.  Patient denies any medication changes, diet changes, or OTC/herbal supplement changes since last visit.  Patient denies any adverse reactions or barriers.  Patient denies any CP/SOB, fatigue, bleeding or bruising since last visit.   Patient denies any change in alcohol or tobacco use since last visit.   Patient denies any upcoming medical or dental procedures.    Plan:  Patient was instructed to start 20 mg weekly .  INR follow up will occur in 1 weeks.  Patient was instructed to maintain consistent vegetable intake, to monitor for any bruising or bleeding, and to call with any medication changes or concerns.    Pt handout given with above information    Oleg Cook, TatoD  P:[phone]  F:[phone]

## 2025-01-16 ENCOUNTER — ANTICOAGULATION - WARFARIN VISIT (OUTPATIENT)
Dept: PHARMACY | Facility: HOSPITAL | Age: 80
End: 2025-01-16
Payer: MEDICARE

## 2025-01-16 DIAGNOSIS — I48.11 LONGSTANDING PERSISTENT ATRIAL FIBRILLATION (MULTI): Primary | ICD-10-CM

## 2025-01-16 LAB
POC INR: 3.6
POC PROTHROMBIN TIME: NORMAL

## 2025-01-16 PROCEDURE — 99211 OFF/OP EST MAY X REQ PHY/QHP: CPT | Performed by: PHARMACIST

## 2025-01-16 PROCEDURE — 85610 PROTHROMBIN TIME: CPT | Mod: QW

## 2025-01-16 NOTE — PROGRESS NOTES
Pt enrolled in [facility] for management of Longstanding persistent atrial fibrillation (Multi) [I48.11].     Pt current location in clinic.     Current anticoagulant: Warfarin    Time since last visit: 1 weeks    Last INR: 3.5 on warfarin 22.5 mg in the previous week. Pt was instructed to start 20 mg weekly at last visit.    Last Creatinine:   Lab Results   Component Value Date    CREATININE 0.96 12/03/2024     Last hemoglobin/hematocrit:  Lab Results   Component Value Date    HGB 13.4 12/03/2024     Lab Results   Component Value Date    HCT 43.1 12/03/2024       Current INR: 3.6 is SUPRAtherapeutic for goal range of 2.0-3.0 and is reflective of 22.5 mg in the previous week prior to visit.    Dosing confirmed with patient  Patient denies any missed doses.  Patient denies any medication changes, diet changes, or OTC/herbal supplement changes since last visit.  Patient denies any adverse reactions or barriers.  Patient denies any CP/SOB, fatigue, bleeding or bruising since last visit.   Patient denies any change in alcohol or tobacco use since last visit.   Ablation set for 1/20 still    Plan:  Patient was instructed to skip warfarin today, then resume current dose .  INR follow up will occur in 1 weeks.  Patient was instructed to maintain consistent vegetable intake, to monitor for any bruising or bleeding, and to call with any medication changes or concerns.    Pt handout given with above information    Oleg Cook, PharmD  P:[phone]  F:[phone]

## 2025-01-17 ENCOUNTER — APPOINTMENT (OUTPATIENT)
Dept: PHARMACY | Facility: HOSPITAL | Age: 80
End: 2025-01-17
Payer: MEDICARE

## 2025-01-24 ENCOUNTER — APPOINTMENT (OUTPATIENT)
Dept: PHARMACY | Facility: HOSPITAL | Age: 80
End: 2025-01-24
Payer: MEDICARE

## 2025-01-27 ENCOUNTER — PHARMACY VISIT (OUTPATIENT)
Dept: PHARMACY | Facility: CLINIC | Age: 80
End: 2025-01-27
Payer: COMMERCIAL

## 2025-01-27 ENCOUNTER — ANTICOAGULATION - WARFARIN VISIT (OUTPATIENT)
Dept: PHARMACY | Facility: HOSPITAL | Age: 80
End: 2025-01-27
Payer: MEDICARE

## 2025-01-27 DIAGNOSIS — I48.11 LONGSTANDING PERSISTENT ATRIAL FIBRILLATION (MULTI): Primary | ICD-10-CM

## 2025-01-27 LAB
POC INR: 2.8
POC PROTHROMBIN TIME: NORMAL

## 2025-01-27 PROCEDURE — 99211 OFF/OP EST MAY X REQ PHY/QHP: CPT | Performed by: PHARMACIST

## 2025-01-27 PROCEDURE — 85610 PROTHROMBIN TIME: CPT | Mod: QW

## 2025-01-27 PROCEDURE — RXMED WILLOW AMBULATORY MEDICATION CHARGE

## 2025-01-27 NOTE — PROGRESS NOTES
Pt enrolled in [facility] for management of Longstanding persistent atrial fibrillation (Multi) [I48.11].     Pt current location in clinic.     Current anticoagulant: Warfarin    Time since last visit: 1.5 weeks    Last INR: 3.6 on warfarin 22.5 mg in the previous week. Pt was instructed to hold warfarin once, then start 20mg weekly at last visit.    Last Creatinine:   Lab Results   Component Value Date    CREATININE 0.96 12/03/2024     Last hemoglobin/hematocrit:  Lab Results   Component Value Date    HGB 13.4 12/03/2024     Lab Results   Component Value Date    HCT 43.1 12/03/2024       Current INR: 2.8 is therapeutic for goal range of 2.0-3.0 and is reflective of 20 mg in the previous week prior to visit.    Dosing confirmed with patient  Patient denies any missed doses.  Patient denies any medication changes, diet changes, or OTC/herbal supplement changes since last visit.  Patient denies any adverse reactions or barriers.  Patient denies any CP/SOB, fatigue, bleeding since last visit - has some arm bruising, will continue to monitor  Patient denies any change in alcohol or tobacco use since last visit.   Patient denies any upcoming medical or dental procedures - had ablation which she reports went well. INR was 2.5 during her stay    Plan:  Patient was instructed to continue with current warfarin dose.  INR follow up will occur in 2 weeks.  Patient was instructed to maintain consistent vegetable intake, to monitor for any bruising or bleeding, and to call with any medication changes or concerns.    Pt handout given with above information    Oleg Cook, Ibis  P:[phone]  F:[phone]

## 2025-01-31 ENCOUNTER — TELEPHONE (OUTPATIENT)
Dept: PRIMARY CARE | Facility: CLINIC | Age: 80
End: 2025-01-31
Payer: MEDICARE

## 2025-01-31 DIAGNOSIS — E11.51 DIABETES MELLITUS TYPE 2 WITH PERIPHERAL ARTERY DISEASE: ICD-10-CM

## 2025-01-31 RX ORDER — DEXTROSE 4 G
1 TABLET,CHEWABLE ORAL DAILY
Qty: 1 EACH | Refills: 1 | Status: SHIPPED | OUTPATIENT
Start: 2025-01-31

## 2025-02-04 ENCOUNTER — APPOINTMENT (OUTPATIENT)
Dept: PRIMARY CARE | Facility: CLINIC | Age: 80
End: 2025-02-04
Payer: MEDICARE

## 2025-02-04 VITALS
SYSTOLIC BLOOD PRESSURE: 140 MMHG | DIASTOLIC BLOOD PRESSURE: 78 MMHG | HEIGHT: 62 IN | WEIGHT: 161.2 LBS | HEART RATE: 73 BPM | OXYGEN SATURATION: 96 % | BODY MASS INDEX: 29.66 KG/M2

## 2025-02-04 DIAGNOSIS — E11.51 DIABETES MELLITUS TYPE 2 WITH PERIPHERAL ARTERY DISEASE: ICD-10-CM

## 2025-02-04 PROCEDURE — 1036F TOBACCO NON-USER: CPT

## 2025-02-04 PROCEDURE — 3077F SYST BP >= 140 MM HG: CPT

## 2025-02-04 PROCEDURE — 99214 OFFICE O/P EST MOD 30 MIN: CPT

## 2025-02-04 PROCEDURE — 1160F RVW MEDS BY RX/DR IN RCRD: CPT

## 2025-02-04 PROCEDURE — 1159F MED LIST DOCD IN RCRD: CPT

## 2025-02-04 PROCEDURE — 3078F DIAST BP <80 MM HG: CPT

## 2025-02-04 RX ORDER — SENNOSIDES/DOCUSATE SODIUM 8.6MG-50MG
2 TABLET ORAL
Qty: 100 EACH | Refills: 2 | Status: SHIPPED | OUTPATIENT
Start: 2025-02-04 | End: 2025-02-05

## 2025-02-04 ASSESSMENT — ENCOUNTER SYMPTOMS
ARTHRALGIAS: 0
FREQUENCY: 0
FATIGUE: 0
CONSTIPATION: 0
DIARRHEA: 0
UNEXPECTED WEIGHT CHANGE: 0
PALPITATIONS: 0
WOUND: 0
POLYPHAGIA: 0
CHEST TIGHTNESS: 0
CHILLS: 0
POLYDIPSIA: 0
NAUSEA: 0
WEAKNESS: 0
TROUBLE SWALLOWING: 0
MYALGIAS: 0
SHORTNESS OF BREATH: 0
DIAPHORESIS: 0
HEADACHES: 0
NUMBNESS: 0
DIFFICULTY URINATING: 0
NERVOUS/ANXIOUS: 0
ABDOMINAL PAIN: 0
RECTAL PAIN: 0

## 2025-02-04 ASSESSMENT — PATIENT HEALTH QUESTIONNAIRE - PHQ9
2. FEELING DOWN, DEPRESSED OR HOPELESS: NOT AT ALL
SUM OF ALL RESPONSES TO PHQ9 QUESTIONS 1 AND 2: 0
1. LITTLE INTEREST OR PLEASURE IN DOING THINGS: NOT AT ALL

## 2025-02-04 NOTE — PROGRESS NOTES
Subjective   Patient ID: Tish Linton is a 79 y.o. female who presents for 2 month Follow up (PT is here today for 2 month follow up. Also reports this is a medication check and she has some questions about them. ).    Presents today for evaluation of hyperglycemia.    Type 2 diabetes mellitus: Most recent A1c 7.2.  Patient states she has been reading some things on the Internet about metformin and feels the drug is dangerous for her to take.  She would like to stop taking and start taking holistic over-the-counter medications instead.  I did discuss with her the safe use of metformin and its side effects.  However, she would like to still discontinue use and attempt treatment with herbal supplement.  We discussed signs symptoms of hyperglycemia, she does have test strips at home.  She will check her blood sugars daily and has told me that she will stop metformin and report any hyperglycemic episodes to me.  I did discuss alternative medications with her for treatment if she does have any hyperglycemia.  Will obtain a new BMP and HgbA1c in 3 months.         Review of Systems   Constitutional:  Negative for chills, diaphoresis, fatigue and unexpected weight change.   HENT:  Negative for dental problem, tinnitus and trouble swallowing.    Eyes:  Negative for visual disturbance.   Respiratory:  Negative for chest tightness and shortness of breath.    Cardiovascular:  Negative for chest pain, palpitations and leg swelling.   Gastrointestinal:  Negative for abdominal pain, constipation, diarrhea, nausea and rectal pain.   Endocrine: Negative for polydipsia, polyphagia and polyuria.   Genitourinary:  Negative for difficulty urinating, frequency and urgency.   Musculoskeletal:  Negative for arthralgias and myalgias.   Skin:  Negative for pallor and wound.   Neurological:  Negative for syncope, weakness, numbness and headaches.   Psychiatric/Behavioral:  Negative for suicidal ideas. The patient is not nervous/anxious.   "      Objective   /78   Pulse 73   Ht 1.562 m (5' 1.5\")   Wt 73.1 kg (161 lb 3.2 oz)   SpO2 96%   BMI 29.97 kg/m²     Physical Exam  Vitals and nursing note reviewed.   Constitutional:       General: She is not in acute distress.     Appearance: Normal appearance. She is obese.   HENT:      Head: Normocephalic.      Right Ear: Tympanic membrane, ear canal and external ear normal.      Left Ear: Tympanic membrane, ear canal and external ear normal.      Nose: Nose normal.      Mouth/Throat:      Mouth: Mucous membranes are moist.      Pharynx: Oropharynx is clear.   Eyes:      Pupils: Pupils are equal, round, and reactive to light.   Neck:      Vascular: No carotid bruit.   Cardiovascular:      Rate and Rhythm: Normal rate and regular rhythm.      Pulses: Normal pulses.      Heart sounds: Normal heart sounds.   Pulmonary:      Effort: Pulmonary effort is normal. No respiratory distress.      Breath sounds: Normal breath sounds. No stridor. No wheezing or rhonchi.   Abdominal:      General: Bowel sounds are normal.      Palpations: Abdomen is soft.   Musculoskeletal:         General: Normal range of motion.      Cervical back: Normal range of motion.   Skin:     General: Skin is warm and dry.      Capillary Refill: Capillary refill takes less than 2 seconds.   Neurological:      General: No focal deficit present.      Mental Status: She is alert and oriented to person, place, and time. Mental status is at baseline.   Psychiatric:         Mood and Affect: Mood normal.         Behavior: Behavior normal.         Thought Content: Thought content normal.         Judgment: Judgment normal.         Assessment/Plan   Diagnoses and all orders for this visit:  Diabetes mellitus type 2 with peripheral artery disease  -     blood sugar diagnostic (Advanced Gluc Meter Test Strip) strip; 2 each once daily.  -     Basic Metabolic Panel; Future  -     Hemoglobin A1C; Future         "

## 2025-02-05 ENCOUNTER — TELEPHONE (OUTPATIENT)
Dept: PRIMARY CARE | Facility: CLINIC | Age: 80
End: 2025-02-05
Payer: MEDICARE

## 2025-02-05 RX ORDER — SENNOSIDES/DOCUSATE SODIUM 8.6MG-50MG
TABLET ORAL
Qty: 100 EACH | Refills: 2 | Status: SHIPPED | OUTPATIENT
Start: 2025-02-05

## 2025-02-05 NOTE — TELEPHONE ENCOUNTER
Pharmacy is requesting a new script for test strips. She uses the True Metrix test strips and her script needs to say 1 strip once a day.

## 2025-02-07 ENCOUNTER — TELEPHONE (OUTPATIENT)
Dept: PRIMARY CARE | Facility: CLINIC | Age: 80
End: 2025-02-07
Payer: MEDICARE

## 2025-02-07 DIAGNOSIS — R05.1 ACUTE COUGH: Primary | ICD-10-CM

## 2025-02-07 DIAGNOSIS — J01.90 ACUTE NON-RECURRENT SINUSITIS, UNSPECIFIED LOCATION: Primary | ICD-10-CM

## 2025-02-07 RX ORDER — CEFDINIR 300 MG/1
300 CAPSULE ORAL 2 TIMES DAILY
Qty: 14 CAPSULE | Refills: 0 | Status: SHIPPED | OUTPATIENT
Start: 2025-02-07 | End: 2025-02-14

## 2025-02-07 RX ORDER — BENZONATATE 200 MG/1
200 CAPSULE ORAL 3 TIMES DAILY PRN
Qty: 42 CAPSULE | Refills: 0 | Status: SHIPPED | OUTPATIENT
Start: 2025-02-07 | End: 2025-03-09

## 2025-02-07 NOTE — TELEPHONE ENCOUNTER
Called back and is requesting a medication for cough and congestion along with the medication sent to [facility].

## 2025-02-07 NOTE — TELEPHONE ENCOUNTER
Reports she had an appointment with you recently and her cold is getting worse. Cefdinir is what you called in last time and she is wanting this medication called in.

## 2025-02-07 NOTE — TELEPHONE ENCOUNTER
Spoke to pt and let her know PCP called in abx and cough medication, let her know what PCP advised, she expressed a verbal understanding and nothing further was needed at this time.

## 2025-02-07 NOTE — TELEPHONE ENCOUNTER
Let her know I sent in a cough medicine, for the congestion I would recommend over-the-counter nasal spray and Mucinex.  Both are cheaper over-the-counter generic than what I would prescribe and are the same medicine.

## 2025-02-17 ENCOUNTER — TELEPHONE (OUTPATIENT)
Dept: PRIMARY CARE | Facility: CLINIC | Age: 80
End: 2025-02-17
Payer: MEDICARE

## 2025-02-18 ENCOUNTER — ANTICOAGULATION - WARFARIN VISIT (OUTPATIENT)
Dept: PHARMACY | Facility: HOSPITAL | Age: 80
End: 2025-02-18
Payer: MEDICARE

## 2025-02-18 DIAGNOSIS — I48.11 LONGSTANDING PERSISTENT ATRIAL FIBRILLATION (MULTI): Primary | ICD-10-CM

## 2025-02-18 LAB
POC INR: 4.2
POC PROTHROMBIN TIME: NORMAL

## 2025-02-18 PROCEDURE — 85610 PROTHROMBIN TIME: CPT | Mod: QW

## 2025-02-18 PROCEDURE — 99211 OFF/OP EST MAY X REQ PHY/QHP: CPT | Performed by: PHARMACIST

## 2025-02-18 NOTE — PROGRESS NOTES
Pt enrolled in [facility] for management of Longstanding persistent atrial fibrillation (Multi) [I48.11].     Pt current location in clinic.     Current anticoagulant: Warfarin    Time since last visit: 3 weeks    Last INR: 2.8 on warfarin 22.5 mg in the previous week. No changes were made at that time.    Last Creatinine:   Lab Results   Component Value Date    CREATININE 0.96 12/03/2024     Last hemoglobin/hematocrit:  Lab Results   Component Value Date    HGB 13.4 12/03/2024     Lab Results   Component Value Date    HCT 43.1 12/03/2024       Current INR: 4.2 is SUPRAtherapeutic for goal range of 2.0-3.0 and is reflective of 20 mg in the previous week prior to visit.    Dosing confirmed with patient  Patient denies any missed doses.  Patient denies any diet changes, or OTC/herbal supplement changes since last visit.  Pt notes she just finished a course of cefdinir which ended 3 days ago.  Pt also notes she went off of metformin.  She is trying herbal supplement which is to help with blood sugar.    Patient denies any adverse reactions or barriers.  Patient denies any CP/SOB, fatigue, bleeding or bruising since last visit.   Pt notes she fell and busted her head open.  She went to the ER and they examined her.  She is also getting over a respiratory infection.    Patient denies any change in alcohol or tobacco use since last visit.   Patient denies any upcoming medical or dental procedures.    Plan:  Patient was instructed to hold x 2 then continue with current warfarin dose.  INR follow up will occur in 2 weeks.  Patient was instructed to maintain consistent vegetable intake, to monitor for any bruising or bleeding, and to call with any medication changes or concerns.    Pt handout given with above information    Vianey Miranda, PharmD  P:[phone]  F:[phone]

## 2025-02-19 ENCOUNTER — OFFICE VISIT (OUTPATIENT)
Dept: PRIMARY CARE | Facility: CLINIC | Age: 80
End: 2025-02-19
Payer: MEDICARE

## 2025-02-19 VITALS
OXYGEN SATURATION: 94 % | SYSTOLIC BLOOD PRESSURE: 142 MMHG | BODY MASS INDEX: 29.48 KG/M2 | DIASTOLIC BLOOD PRESSURE: 84 MMHG | HEART RATE: 78 BPM | HEIGHT: 62 IN | WEIGHT: 160.2 LBS

## 2025-02-19 DIAGNOSIS — R42 DIZZINESS: Primary | ICD-10-CM

## 2025-02-19 PROCEDURE — 1159F MED LIST DOCD IN RCRD: CPT

## 2025-02-19 PROCEDURE — 3079F DIAST BP 80-89 MM HG: CPT

## 2025-02-19 PROCEDURE — 1036F TOBACCO NON-USER: CPT

## 2025-02-19 PROCEDURE — 99213 OFFICE O/P EST LOW 20 MIN: CPT

## 2025-02-19 PROCEDURE — 3077F SYST BP >= 140 MM HG: CPT

## 2025-02-19 PROCEDURE — 1160F RVW MEDS BY RX/DR IN RCRD: CPT

## 2025-02-19 RX ORDER — MECLIZINE HYDROCHLORIDE 25 MG/1
25 TABLET ORAL 3 TIMES DAILY PRN
COMMUNITY

## 2025-02-19 ASSESSMENT — ENCOUNTER SYMPTOMS
TREMORS: 0
DIARRHEA: 0
ABDOMINAL PAIN: 0
WEAKNESS: 0
ARTHRALGIAS: 0
NUMBNESS: 0
LIGHT-HEADEDNESS: 0
DIZZINESS: 1
DIAPHORESIS: 0
HEADACHES: 0
CHEST TIGHTNESS: 0
FACIAL ASYMMETRY: 0
POLYPHAGIA: 0
POLYDIPSIA: 0
SPEECH DIFFICULTY: 0
WOUND: 0
DIFFICULTY URINATING: 0
MYALGIAS: 0
PALPITATIONS: 0
SHORTNESS OF BREATH: 0
CHILLS: 0
FATIGUE: 0
UNEXPECTED WEIGHT CHANGE: 0
TROUBLE SWALLOWING: 0
CONSTIPATION: 0
FREQUENCY: 0
NAUSEA: 0
RECTAL PAIN: 0
NERVOUS/ANXIOUS: 0
SEIZURES: 0

## 2025-02-19 ASSESSMENT — PATIENT HEALTH QUESTIONNAIRE - PHQ9
2. FEELING DOWN, DEPRESSED OR HOPELESS: NOT AT ALL
1. LITTLE INTEREST OR PLEASURE IN DOING THINGS: NOT AT ALL
SUM OF ALL RESPONSES TO PHQ9 QUESTIONS 1 AND 2: 0

## 2025-02-19 NOTE — PROGRESS NOTES
Subjective   Patient ID: Tish Linton is a 79 y.o. female who presents for Dizziness (States had a dizzy spell resulting in a fall on Wednesday, 2/12/25.  States she hit her face/head against a door jam. States was seen in [facility] ER, [place].  Had CT scan and evaluation. Here with daughter, Darcy huddleston.).    Patient presents today for ER follow-up.  She had become dizzy and fell striking her face against a door jam with a small superficial laceration to her upper forehead.  Was seen in [facility] emergency department in [place].  Had a CT scan with no acute findings.    Laceration: Wound healing without any signs or symptoms of infection.  No sutures applied.    Dizziness: Patient been seen by her cardiologist yesterday who believes that her dizziness is a fluid volume issue.  His discontinued use of her Lasix.  She has not taken it for 2 days has no edema in her extremities.  She has been taking this prior to having an ablation which corrected her atrial fibrillation.  She has had no atrial fibrillation since the ablation.  Denies any further syncopal episodes or dizziness.  Will follow cardiology's recommendation and monitor on follow-up.    Dizziness  Pertinent negatives include no abdominal pain, arthralgias, chest pain, chills, diaphoresis, fatigue, headaches, myalgias, nausea, numbness or weakness.        Review of Systems   Constitutional:  Negative for chills, diaphoresis, fatigue and unexpected weight change.   HENT:  Negative for dental problem, tinnitus and trouble swallowing.    Eyes:  Negative for visual disturbance.   Respiratory:  Negative for chest tightness and shortness of breath.    Cardiovascular:  Negative for chest pain, palpitations and leg swelling.   Gastrointestinal:  Negative for abdominal pain, constipation, diarrhea, nausea and rectal pain.   Endocrine: Negative for polydipsia, polyphagia and polyuria.   Genitourinary:  Negative for difficulty urinating, frequency and urgency.  "  Musculoskeletal:  Negative for arthralgias and myalgias.   Skin:  Negative for pallor and wound.   Neurological:  Positive for dizziness. Negative for tremors, seizures, syncope, facial asymmetry, speech difficulty, weakness, light-headedness, numbness and headaches.   Psychiatric/Behavioral:  Negative for suicidal ideas. The patient is not nervous/anxious.        Objective   /84 (BP Location: Left arm, Patient Position: Sitting, BP Cuff Size: Adult)   Pulse 78   Ht 1.562 m (5' 1.5\")   Wt 72.7 kg (160 lb 3.2 oz)   SpO2 94%   BMI 29.78 kg/m²     Physical Exam  Vitals and nursing note reviewed.   Constitutional:       General: She is not in acute distress.     Appearance: Normal appearance. She is normal weight.   HENT:      Head: Normocephalic.      Nose: Nose normal.      Mouth/Throat:      Mouth: Mucous membranes are moist.      Pharynx: Oropharynx is clear.   Eyes:      Pupils: Pupils are equal, round, and reactive to light.   Cardiovascular:      Rate and Rhythm: Normal rate and regular rhythm.      Pulses: Normal pulses.      Heart sounds: Normal heart sounds. No murmur heard.  Pulmonary:      Effort: Pulmonary effort is normal. No respiratory distress.      Breath sounds: Normal breath sounds. No stridor. No wheezing, rhonchi or rales.   Abdominal:      General: Bowel sounds are normal.      Palpations: Abdomen is soft.   Musculoskeletal:         General: Normal range of motion.      Cervical back: Normal range of motion.   Skin:     General: Skin is warm and dry.      Capillary Refill: Capillary refill takes less than 2 seconds.   Neurological:      General: No focal deficit present.      Mental Status: She is alert and oriented to person, place, and time. Mental status is at baseline.   Psychiatric:         Mood and Affect: Mood normal.         Behavior: Behavior normal.         Thought Content: Thought content normal.         Judgment: Judgment normal.         Assessment/Plan   Diagnoses and all " orders for this visit:  Dizziness

## 2025-03-04 ENCOUNTER — ANTICOAGULATION - WARFARIN VISIT (OUTPATIENT)
Dept: PHARMACY | Facility: HOSPITAL | Age: 80
End: 2025-03-04
Payer: MEDICARE

## 2025-03-04 DIAGNOSIS — I48.11 LONGSTANDING PERSISTENT ATRIAL FIBRILLATION (MULTI): Primary | ICD-10-CM

## 2025-03-04 LAB
POC INR: 2.4
POC PROTHROMBIN TIME: NORMAL

## 2025-03-04 PROCEDURE — 85610 PROTHROMBIN TIME: CPT | Mod: QW

## 2025-03-04 PROCEDURE — 99211 OFF/OP EST MAY X REQ PHY/QHP: CPT | Performed by: PHARMACIST

## 2025-03-04 NOTE — PROGRESS NOTES
Pt enrolled in [facility] for management of Longstanding persistent atrial fibrillation (Multi) [I48.11].     Pt current location in clinic.     Current anticoagulant: Warfarin    Time since last visit: 2 weeks    Last INR: 4.2 on warfarin 20 mg in the previous week. Pt was instructed to hold warfarin for 2 days, then resume usual dosing at last visit.    Last Creatinine:   Lab Results   Component Value Date    CREATININE 0.96 12/03/2024     Last hemoglobin/hematocrit:  Lab Results   Component Value Date    HGB 13.4 12/03/2024     Lab Results   Component Value Date    HCT 43.1 12/03/2024       Current INR: 2.4 is therapeutic for goal range of 2.0-3.0 and is reflective of 20 mg in the previous week prior to visit.    Dosing confirmed with patient  Patient denies any missed doses.  Patient denies any medication changes, diet changes, or OTC/herbal supplement changes since last visit  - still using DB-7 supplement in place of metformin.   Patient denies any adverse reactions or barriers.  Patient denies any CP/SOB, fatigue, bleeding or bruising since last visit.   Patient denies any change in alcohol or tobacco use since last visit.   Patient denies any upcoming medical or dental procedures.    Plan:  Patient was instructed to continue with current warfarin dose.  INR follow up will occur in 4 weeks.  Patient was instructed to maintain consistent vegetable intake, to monitor for any bruising or bleeding, and to call with any medication changes or concerns.    Pt handout given with above information    Oleg Cook, TatoD  P:[phone]  F:[phone]

## 2025-03-31 ENCOUNTER — ANTICOAGULATION - WARFARIN VISIT (OUTPATIENT)
Dept: PHARMACY | Facility: HOSPITAL | Age: 80
End: 2025-03-31
Payer: MEDICARE

## 2025-03-31 DIAGNOSIS — I48.11 LONGSTANDING PERSISTENT ATRIAL FIBRILLATION (MULTI): Primary | ICD-10-CM

## 2025-03-31 LAB
POC INR: 2.8
POC PROTHROMBIN TIME: NORMAL

## 2025-03-31 PROCEDURE — 99211 OFF/OP EST MAY X REQ PHY/QHP: CPT | Performed by: PHARMACIST

## 2025-03-31 PROCEDURE — 85610 PROTHROMBIN TIME: CPT | Mod: QW

## 2025-03-31 NOTE — PROGRESS NOTES
Pt enrolled in [facility] for management of Longstanding persistent atrial fibrillation (Multi) [I48.11].     Pt current location in clinic.     Current anticoagulant: Warfarin    Time since last visit: 4 weeks    Last INR: 2.4 on warfarin 20 mg in the previous week. No changes were made at that time.    Last Creatinine:   Lab Results   Component Value Date    CREATININE 0.96 12/03/2024     Last hemoglobin/hematocrit:  Lab Results   Component Value Date    HGB 13.4 12/03/2024     Lab Results   Component Value Date    HCT 43.1 12/03/2024       Current INR: 2.8 is therapeutic for goal range of 2.0-3.0 and is reflective of 20 mg in the previous week prior to visit.    Dosing confirmed with patient  Patient denies any missed doses.  Patient denies any medication changes, diet changes, or OTC/herbal supplement changes since last visit.  Patient denies any adverse reactions or barriers.  Patient denies any CP/SOB, fatigue, bleeding or bruising since last visit.   Patient denies any change in alcohol or tobacco use since last visit.   Patient denies any upcoming medical or dental procedures.    Plan:  Patient was instructed to continue with current warfarin dose.  INR follow up will occur in 4 weeks.  Patient was instructed to maintain consistent vegetable intake, to monitor for any bruising or bleeding, and to call with any medication changes or concerns.    Pt handout given with above information    Oleg Cook, PharmD  P:[phone]  F:[phone]

## 2025-04-01 ENCOUNTER — APPOINTMENT (OUTPATIENT)
Dept: PHARMACY | Facility: HOSPITAL | Age: 80
End: 2025-04-01
Payer: MEDICARE

## 2025-04-18 ENCOUNTER — TELEPHONE (OUTPATIENT)
Dept: PRIMARY CARE | Facility: CLINIC | Age: 80
End: 2025-04-18
Payer: MEDICARE

## 2025-04-18 NOTE — TELEPHONE ENCOUNTER
Message from patient stating she has URI symptoms again.  Last time had Clindamycin.  Can't have a Zpak because she's still taking Amiodarone 200mg  Requesting ATB and something for her cough.    [facility]

## 2025-04-18 NOTE — TELEPHONE ENCOUNTER
Can we see if we can get her in for an acute visit beginning of next week?  If possible, it is fine to double book something around 11 or 5.  She recently came off of Lasix and I need to evaluate her lungs to make sure this is just a URI and not something else.

## 2025-04-28 ENCOUNTER — APPOINTMENT (OUTPATIENT)
Dept: PHARMACY | Facility: HOSPITAL | Age: 80
End: 2025-04-28
Payer: MEDICARE

## 2025-04-28 DIAGNOSIS — I48.11 LONGSTANDING PERSISTENT ATRIAL FIBRILLATION (MULTI): Primary | ICD-10-CM

## 2025-04-28 LAB
POC INR: 4.1 (ref 0.9–1.1)
POC PROTHROMBIN TIME: ABNORMAL (ref 9.3–12.5)

## 2025-04-28 PROCEDURE — 85610 PROTHROMBIN TIME: CPT | Mod: QW

## 2025-04-28 PROCEDURE — 99211 OFF/OP EST MAY X REQ PHY/QHP: CPT

## 2025-04-28 NOTE — PROGRESS NOTES
Pt enrolled in [facility] for management of Longstanding persistent atrial fibrillation (Multi) [I48.11].     Pt current location in clinic.     Current anticoagulant: Warfarin    Time since last visit: 4 weeks    Last INR: 2.80 on warfarin 20 mg in the previous week. No changes were made at that time.    Last Creatinine:   Lab Results   Component Value Date    CREATININE 0.96 12/03/2024     Last hemoglobin/hematocrit:  Lab Results   Component Value Date    HGB 13.4 12/03/2024     Lab Results   Component Value Date    HCT 43.1 12/03/2024       Current INR: 4.10 is SUPRAtherapeutic for goal range of 2.0-3.0 and is reflective of 20 mg in the previous week prior to visit.    Dosing confirmed with patient  Patient denies any missed doses.  Patient in a wheelchair today due to a mechanical fall this AM on her L side; she equates it to slippery ground from the rain and shoes that don't have much . She was able to pick herself up and didn't note any LOC or major bleeding/bruising to the L hip/thigh. She doesn't feel any swelling @ the site of injury and notes some pain, but she able to move it around and was able to drive here to today's appointment --> she was encouraged to report to the ED if she notices major swelling/bruising, increased pain or very limited mobility @ the joint site.  Patient noted less greens  and and less of an appetite overall in the past 1-2 weeks due to having a cold. Patient denies any medication changes or OTC/herbal supplement changes since last visit.  Patient denies any adverse reactions or barriers.  Patient denies any CP/SOB, fatigue, bleeding or bruising since last visit.   Patient denies any change in alcohol or tobacco use since last visit.   Patient denies any upcoming medical or dental procedures.    Plan:  Patient was instructed to  hold dose today and tomorrow, then resume your normal dose thereafter .  INR follow up will occur in 10 days.  Patient was instructed to maintain  consistent vegetable intake, to monitor for any bruising or bleeding, and to call with any medication changes or concerns.    Pt handout given with above information    Marcelino Dodd PharmD BCPS  P:[phone]  F:[phone]

## 2025-05-01 ENCOUNTER — TELEPHONE (OUTPATIENT)
Dept: PRIMARY CARE | Facility: CLINIC | Age: 80
End: 2025-05-01
Payer: MEDICARE
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
## 2025-05-01 NOTE — TELEPHONE ENCOUNTER
"Message from patient that she wasn't able to get an appt but she fell 2 or 3 days ago onto her hip in some gravel.  Was able to get up on her own and walking on it.  It's a bit painful, but noticed yesterday a bruise.  Not sure if you want to work her in, but she said she should have it checked \"for a bleed\".  She's able to walk, get around, drive, etc.  Please advise  "

## 2025-05-02 ENCOUNTER — HOSPITAL ENCOUNTER (OUTPATIENT)
Dept: RADIOLOGY | Facility: CLINIC | Age: 80
Discharge: HOME | End: 2025-05-02
Payer: MEDICARE

## 2025-05-02 ENCOUNTER — TELEPHONE (OUTPATIENT)
Dept: PRIMARY CARE | Facility: CLINIC | Age: 80
End: 2025-05-02
Payer: MEDICARE
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
## 2025-05-02 DIAGNOSIS — S72.112A CLOSED DISPLACED FRACTURE OF GREATER TROCHANTER OF LEFT FEMUR, INITIAL ENCOUNTER: Primary | ICD-10-CM

## 2025-05-02 PROCEDURE — 73502 X-RAY EXAM HIP UNI 2-3 VIEWS: CPT | Mod: LT

## 2025-05-02 NOTE — RESULT ENCOUNTER NOTE
Patient is scheduled for CT left hip.  For confirmation of fracture.  She is awaiting scheduling phone call

## 2025-05-02 NOTE — PROGRESS NOTES
Spoke with Dr. Neri with [facility] orthopedics.  He would like CT of left hip to confirm greater trochanter fracture seen on x-ray.  The patient is ambulatory at this time.  Recommendation is that if CT confirms fracture the patient can follow-up outpatient in his office with nurse practitioner.

## 2025-05-04 DIAGNOSIS — E11.51 DIABETES MELLITUS TYPE 2 WITH PERIPHERAL ARTERY DISEASE: ICD-10-CM

## 2025-05-09 ENCOUNTER — ANTICOAGULATION - WARFARIN VISIT (OUTPATIENT)
Dept: PHARMACY | Facility: HOSPITAL | Age: 80
End: 2025-05-09
Payer: MEDICARE

## 2025-05-09 DIAGNOSIS — I48.11 LONGSTANDING PERSISTENT ATRIAL FIBRILLATION (MULTI): Primary | ICD-10-CM

## 2025-05-09 LAB
POC INR: 3.5 (ref 0.9–1.1)
POC PROTHROMBIN TIME: ABNORMAL (ref 9.3–12.5)

## 2025-05-09 PROCEDURE — 85610 PROTHROMBIN TIME: CPT | Mod: QW

## 2025-05-09 PROCEDURE — 99211 OFF/OP EST MAY X REQ PHY/QHP: CPT

## 2025-05-09 NOTE — PROGRESS NOTES
Pt enrolled in [facility] clinic for management of Longstanding persistent atrial fibrillation (Multi) [I48.11].     Pt current location in clinic.     Current anticoagulant: Warfarin    Time since last visit: 11 days    Last INR: 4.1 on warfarin 20 mg in the previous week. Pt was instructed to hold dose today and tomorrow, then resume your normal dose thereafter at last visit.    Last Creatinine:   Lab Results   Component Value Date    CREATININE 0.96 12/03/2024     Last hemoglobin/hematocrit:  Lab Results   Component Value Date    HGB 13.4 12/03/2024     Lab Results   Component Value Date    HCT 43.1 12/03/2024       Current INR: 3.5 is SUPRAtherapeutic for goal range of 2.0-3.0 and is reflective of 15 mg in the previous week prior to visit.    Dosing confirmed with patient  Patient denies any missed doses.  Patient denies any medication changes, diet changes, or OTC/herbal supplement changes since last visit.  Patient denies any adverse reactions or barriers.  Patient denies any CP/SOB, fatigue, bleeding or bruising since last visit.   Patient denies any change in alcohol or tobacco use since last visit.   Patient denies any upcoming medical or dental procedures.    Patient fell and landed on her left hip as per note from 04/28/25. At the time of today's [facility] visit, her left leg is sore and she reports swelling of the hip and knee. Patient did end up going to the emergency room on 05/03/25 where it was found that she had a closed fracture of trochanter of left femur. She does not require surgery to repair the bone. She was prescribed ketorolac and percocet at that time. She presented to Dr. Neri's office for follow up. When they were looking at refilling her ketorolac, they noticed that she was on warfarin and opted not to refill the ketorolac. Instead, they sent a prescription over to [facility] to switch her to Pradaxa.    Received a message from Radha at [facility] stating that patient could switch to  Pradaxa once her INR was below 2. Patient and granddaughter presented to the appointment today. The patient has a history of symptomatic PE's in the past when she was switched from warfarin to Eliquis. Subsequently, patient was switched back to warfarin and both she and her granddaughter reported symptom improvement (resolution of SOB). This has occurred within the past year per patient and family. Patient does report that she does still have 2 small, stable PE's.  There are concerns that patient will develop more blood clots if she is switched to Pradaxa. Called Radha back at [phone] and left a message requesting a return call to discuss whether it would be in the patient's best interest to change to Pradaxa or continue on warfarin.     Patient's daughter also got the patient 2 different bone supplements for the patient to take. Those supplements were brought to the appointment. One of the supplements did contain a significant amount of vitamin K. Patient was advised to hold off on taking the supplement with the vitamin K in it at this time. If patient wants to revisit this supplement when she is feeling better and her INR is stable, then we can adjust her warfarin at that time.     Patient's daughter suggested that patient only take 2.5 mg on Wednesday this week since her INR was elevated. Patient agreed and only took 2.5 mg on Wednesday. Since patient is in pain and has inflammation, will decrease her regimen to 2.5 mg daily for now and re-assess at follow up as these are likely contributing to her supratherapeutic INR.     Plan:  Patient was instructed to hold your warfarin today then change regimen to 2.5 mg daily.  INR follow up will occur in 1 weeks.  Patient was instructed to maintain consistent vegetable intake, to monitor for any bruising or bleeding, and to call with any medication changes or concerns.    Pt handout given with above information    Winsome Robertson,  PharmD  P:[phone]  F:[phone]

## 2025-05-14 ENCOUNTER — TELEPHONE (OUTPATIENT)
Dept: PRIMARY CARE | Facility: CLINIC | Age: 80
End: 2025-05-14

## 2025-05-14 ENCOUNTER — APPOINTMENT (OUTPATIENT)
Dept: PRIMARY CARE | Facility: CLINIC | Age: 80
End: 2025-05-14
Payer: MEDICARE

## 2025-05-14 VITALS
OXYGEN SATURATION: 94 % | WEIGHT: 167.5 LBS | HEIGHT: 62 IN | SYSTOLIC BLOOD PRESSURE: 140 MMHG | DIASTOLIC BLOOD PRESSURE: 66 MMHG | BODY MASS INDEX: 30.82 KG/M2 | HEART RATE: 66 BPM

## 2025-05-14 DIAGNOSIS — S72.112A CLOSED DISPLACED FRACTURE OF GREATER TROCHANTER OF LEFT FEMUR, INITIAL ENCOUNTER: ICD-10-CM

## 2025-05-14 DIAGNOSIS — L03.116 CELLULITIS OF LEFT LOWER EXTREMITY: ICD-10-CM

## 2025-05-14 DIAGNOSIS — Z00.00 ROUTINE GENERAL MEDICAL EXAMINATION AT HEALTH CARE FACILITY: Primary | ICD-10-CM

## 2025-05-14 DIAGNOSIS — S72.002D CLOSED FRACTURE OF LEFT HIP WITH ROUTINE HEALING, SUBSEQUENT ENCOUNTER: ICD-10-CM

## 2025-05-14 DIAGNOSIS — M79.89 SWELLING OF LOWER LEG: ICD-10-CM

## 2025-05-14 DIAGNOSIS — E11.51 DIABETES MELLITUS TYPE 2 WITH PERIPHERAL ARTERY DISEASE: ICD-10-CM

## 2025-05-14 PROCEDURE — 3077F SYST BP >= 140 MM HG: CPT

## 2025-05-14 PROCEDURE — 1160F RVW MEDS BY RX/DR IN RCRD: CPT

## 2025-05-14 PROCEDURE — 1036F TOBACCO NON-USER: CPT

## 2025-05-14 PROCEDURE — G0439 PPPS, SUBSEQ VISIT: HCPCS

## 2025-05-14 PROCEDURE — 3078F DIAST BP <80 MM HG: CPT

## 2025-05-14 PROCEDURE — 1170F FXNL STATUS ASSESSED: CPT

## 2025-05-14 PROCEDURE — 99214 OFFICE O/P EST MOD 30 MIN: CPT

## 2025-05-14 PROCEDURE — 1159F MED LIST DOCD IN RCRD: CPT

## 2025-05-14 RX ORDER — OXYCODONE AND ACETAMINOPHEN 5; 325 MG/1; MG/1
1 TABLET ORAL EVERY 6 HOURS PRN
COMMUNITY
Start: 2025-05-07 | End: 2025-05-14 | Stop reason: SDUPTHER

## 2025-05-14 RX ORDER — DABIGATRAN ETEXILATE 150 MG/1
150 CAPSULE ORAL 2 TIMES DAILY
COMMUNITY
Start: 2025-05-07 | End: 2025-08-05

## 2025-05-14 RX ORDER — CLINDAMYCIN HYDROCHLORIDE 300 MG/1
300 CAPSULE ORAL 4 TIMES DAILY
Qty: 28 CAPSULE | Refills: 0 | Status: SHIPPED | OUTPATIENT
Start: 2025-05-14 | End: 2025-05-21

## 2025-05-14 RX ORDER — OXYCODONE AND ACETAMINOPHEN 5; 325 MG/1; MG/1
1 TABLET ORAL EVERY 6 HOURS PRN
Qty: 20 TABLET | Refills: 0 | Status: SHIPPED | OUTPATIENT
Start: 2025-05-14 | End: 2025-05-21

## 2025-05-14 ASSESSMENT — ENCOUNTER SYMPTOMS
CONSTIPATION: 0
ARTHRALGIAS: 1
PALPITATIONS: 0
CHILLS: 0
DIFFICULTY URINATING: 0
WEAKNESS: 0
HEADACHES: 0
SHORTNESS OF BREATH: 0
NUMBNESS: 0
DIAPHORESIS: 0
NAUSEA: 0
TROUBLE SWALLOWING: 0
MYALGIAS: 1
NERVOUS/ANXIOUS: 0
CHEST TIGHTNESS: 0
JOINT SWELLING: 1
ABDOMINAL PAIN: 0
FATIGUE: 0
RECTAL PAIN: 0
POLYDIPSIA: 0
DIARRHEA: 0
POLYPHAGIA: 0
UNEXPECTED WEIGHT CHANGE: 0
WOUND: 0
OCCASIONAL FEELINGS OF UNSTEADINESS: 1
FREQUENCY: 0

## 2025-05-14 ASSESSMENT — ACTIVITIES OF DAILY LIVING (ADL)
BATHING: INDEPENDENT
DRESSING: INDEPENDENT
GROCERY_SHOPPING: TOTAL CARE
MANAGING_FINANCES: INDEPENDENT
TAKING_MEDICATION: INDEPENDENT
DOING_HOUSEWORK: INDEPENDENT

## 2025-05-14 NOTE — PROGRESS NOTES
Subjective   Reason for Visit: Tish Linton is an 80 y.o. female here for a Medicare Wellness visit.     Past Medical, Surgical, and Family History reviewed and updated in chart.    Reviewed all medications by prescribing practitioner or clinical pharmacist (such as prescriptions, OTCs, herbal therapies and supplements) and documented in the medical record.    Patient presents today for Medicare wellness checkup.  Had recent greater trochanter fracture and treatment, presents with questions about her injury and changes to her affected limb.    Greater trochanter fracture: Fell 3 weeks ago and has been walking on it.  She called in to request x-ray of hip and was found to have fracture.  Consult orthopedics recommended outpatient CT with follow-up in office.  The outpatient CT could not be scheduled for 3 weeks so she proceeded the emergency department and had CT performed confirming fracture.  Has since followed up with orthopedics ([facility]).  Pain is controlled right now by Percocet.  Patient requesting refill.  Discussed use of medication and refilled medication for acute use and exacerbation pain.  She will try to use Tylenol for other pain relief.    Cellulitis/leg swelling: Since seeing orthopedics she has developed left lower extremity swelling and redness.  Area warm to touch.  Will prescribe antibiotic for treatment.  She is allergic to amoxicillin and doxycycline, is on warfarin which interacts with Bactrim.  Will prescribe clindamycin for treatment.  Ultrasound ordered of left lower extremity as she has a history of blood clots.  She is currently on warfarin.  However she is currently on a reduced dosing, due to elevated INR.  Per her report the last time she reduced dosing of anticoagulant she developed multiple blood clots.        Patient Care Team:  SHARON Olmos-CNP as PCP - General (Family Medicine)  Cy Walters MD as PCP - [employer] PCP     Review of Systems  "  Constitutional:  Negative for chills, diaphoresis, fatigue and unexpected weight change.   HENT:  Negative for dental problem, tinnitus and trouble swallowing.    Eyes:  Negative for visual disturbance.   Respiratory:  Negative for chest tightness and shortness of breath.    Cardiovascular:  Positive for leg swelling. Negative for chest pain and palpitations.   Gastrointestinal:  Negative for abdominal pain, constipation, diarrhea, nausea and rectal pain.   Endocrine: Negative for polydipsia, polyphagia and polyuria.   Genitourinary:  Negative for difficulty urinating, frequency and urgency.   Musculoskeletal:  Positive for arthralgias, joint swelling and myalgias.   Skin:  Negative for pallor and wound.   Neurological:  Negative for syncope, weakness, numbness and headaches.   Psychiatric/Behavioral:  Negative for suicidal ideas. The patient is not nervous/anxious.        Objective   Vitals:  /66   Pulse 66   Ht 1.562 m (5' 1.5\")   Wt 76 kg (167 lb 8 oz)   SpO2 94%   BMI 31.14 kg/m²       Physical Exam  Vitals and nursing note reviewed.   Constitutional:       General: She is not in acute distress.     Appearance: Normal appearance.   HENT:      Head: Normocephalic.      Nose: Nose normal.      Mouth/Throat:      Mouth: Mucous membranes are moist.      Pharynx: Oropharynx is clear.   Eyes:      General: No scleral icterus.     Pupils: Pupils are equal, round, and reactive to light.   Neck:      Vascular: No carotid bruit.   Cardiovascular:      Rate and Rhythm: Normal rate and regular rhythm.      Pulses: Normal pulses.      Heart sounds: Normal heart sounds. No murmur heard.  Pulmonary:      Effort: Pulmonary effort is normal. No respiratory distress.      Breath sounds: Normal breath sounds. No stridor. No wheezing, rhonchi or rales.   Abdominal:      General: Bowel sounds are normal. There is no distension.      Palpations: Abdomen is soft.      Tenderness: There is no abdominal tenderness. There is " no right CVA tenderness or left CVA tenderness.   Musculoskeletal:         General: Swelling (LLE) and tenderness (LLE) present. Normal range of motion.      Cervical back: Normal range of motion.      Right lower leg: No edema.      Left lower leg: Edema present.   Skin:     General: Skin is warm and dry.      Capillary Refill: Capillary refill takes less than 2 seconds.      Findings: Erythema (LLE) present.   Neurological:      General: No focal deficit present.      Mental Status: She is alert and oriented to person, place, and time. Mental status is at baseline.   Psychiatric:         Mood and Affect: Mood normal.         Behavior: Behavior normal.         Thought Content: Thought content normal.         Judgment: Judgment normal.         Assessment & Plan  Cellulitis of left lower extremity    Orders:    clindamycin (Cleocin) 300 mg capsule; Take 1 capsule (300 mg) by mouth 4 times a day for 7 days.    Swelling of lower leg    Orders:    Lower extremity venous duplex left; Future    Closed fracture of left hip with routine healing, subsequent encounter    Orders:    oxyCODONE-acetaminophen (Percocet) 5-325 mg tablet; Take 1 tablet by mouth every 6 hours if needed for moderate pain (4 - 6) for up to 7 days.    Diabetes mellitus type 2 with peripheral artery disease    Orders:    Hemoglobin A1C - Lab collect; Future    Routine general medical examination at health care facility    Orders:    1 Year Follow Up In Primary Care - Wellness Exam; Future    Comprehensive Metabolic Panel; Future    Closed displaced fracture of greater trochanter of left femur, initial encounter

## 2025-05-14 NOTE — TELEPHONE ENCOUNTER
Reports she went to get her meds and the oxycodone was not called in. Please send this medication in.

## 2025-05-15 ENCOUNTER — HOSPITAL ENCOUNTER (OUTPATIENT)
Dept: VASCULAR MEDICINE | Facility: HOSPITAL | Age: 80
Discharge: HOME | End: 2025-05-15
Payer: MEDICARE

## 2025-05-15 DIAGNOSIS — R60.0 LOCALIZED EDEMA: ICD-10-CM

## 2025-05-15 DIAGNOSIS — M79.89 SWELLING OF LOWER LEG: ICD-10-CM

## 2025-05-15 PROCEDURE — 93971 EXTREMITY STUDY: CPT | Performed by: SURGERY

## 2025-05-15 PROCEDURE — 93971 EXTREMITY STUDY: CPT

## 2025-05-15 NOTE — RESULT ENCOUNTER NOTE
Let her know the ultrasound did not show any sign of a blood clot.  Have her continue on the antibiotics that we started at the last visit.

## 2025-05-16 ENCOUNTER — ANTICOAGULATION - WARFARIN VISIT (OUTPATIENT)
Dept: PHARMACY | Facility: HOSPITAL | Age: 80
End: 2025-05-16
Payer: MEDICARE

## 2025-05-16 DIAGNOSIS — I48.11 LONGSTANDING PERSISTENT ATRIAL FIBRILLATION (MULTI): Primary | ICD-10-CM

## 2025-05-16 LAB
POC INR: 2.6 (ref 0.9–1.1)
POC PROTHROMBIN TIME: ABNORMAL (ref 9.3–12.5)

## 2025-05-16 PROCEDURE — 85610 PROTHROMBIN TIME: CPT | Mod: QW

## 2025-05-16 PROCEDURE — 99211 OFF/OP EST MAY X REQ PHY/QHP: CPT | Performed by: PHARMACIST

## 2025-05-16 NOTE — PROGRESS NOTES
Pt enrolled in [facility] for management of Longstanding persistent atrial fibrillation (Multi) [I48.11].     Pt current location in clinic.     Current anticoagulant: Warfarin    Time since last visit: 1 weeks    Last INR: 3.5 on warfarin 15 mg in the previous week. Pt was instructed to hold x 1 at last visit.    Last Creatinine:   Lab Results   Component Value Date    CREATININE 0.96 12/03/2024     Last hemoglobin/hematocrit:  Lab Results   Component Value Date    HGB 13.4 12/03/2024     Lab Results   Component Value Date    HCT 43.1 12/03/2024       Current INR: 2.6 is therapeutic for goal range of 2.0-3.0 and is reflective of 15 mg in the previous week prior to visit.    Dosing confirmed with patient  Patient denies any missed doses.  Patient denies any medication changes, diet changes, or OTC/herbal supplement changes since last visit.  Patient denies any adverse reactions or barriers.  Patient denies any CP/SOB, fatigue, bleeding or bruising since last visit.   Patient denies any change in alcohol or tobacco use since last visit.   Patient denies any upcoming medical or dental procedures.  Pt does note she has thought about switching to pradaxa but wishes to stick with warfarin for now.      Plan:  Patient was instructed to continue with current warfarin dose.  INR follow up will occur in 2 weeks.  Patient was instructed to maintain consistent vegetable intake, to monitor for any bruising or bleeding, and to call with any medication changes or concerns.    Pt handout given with above information    Vianey Miranda, TatoD  P:[phone]  F:[phone]

## 2025-05-21 ENCOUNTER — APPOINTMENT (OUTPATIENT)
Dept: PRIMARY CARE | Facility: CLINIC | Age: 80
End: 2025-05-21
Payer: MEDICARE

## 2025-05-21 VITALS
OXYGEN SATURATION: 95 % | SYSTOLIC BLOOD PRESSURE: 142 MMHG | WEIGHT: 166.9 LBS | HEIGHT: 62 IN | HEART RATE: 70 BPM | BODY MASS INDEX: 30.71 KG/M2 | DIASTOLIC BLOOD PRESSURE: 76 MMHG

## 2025-05-21 DIAGNOSIS — S72.112D CLOSED DISPLACED FRACTURE OF GREATER TROCHANTER OF LEFT FEMUR WITH ROUTINE HEALING, SUBSEQUENT ENCOUNTER: Primary | ICD-10-CM

## 2025-05-21 DIAGNOSIS — E11.43 DIABETIC AUTONOMIC NEUROPATHY ASSOCIATED WITH TYPE 2 DIABETES MELLITUS: ICD-10-CM

## 2025-05-21 DIAGNOSIS — E11.42 DIABETIC POLYNEUROPATHY ASSOCIATED WITH TYPE 2 DIABETES MELLITUS: ICD-10-CM

## 2025-05-21 DIAGNOSIS — I27.20 PULMONARY HYPERTENSION, UNSPECIFIED (MULTI): ICD-10-CM

## 2025-05-21 DIAGNOSIS — I11.0 HYPERTENSIVE HEART DISEASE WITH HEART FAILURE: ICD-10-CM

## 2025-05-21 DIAGNOSIS — I48.91 ATRIAL FIBRILLATION, UNSPECIFIED TYPE (MULTI): ICD-10-CM

## 2025-05-21 DIAGNOSIS — I50.33 ACUTE ON CHRONIC DIASTOLIC CONGESTIVE HEART FAILURE: ICD-10-CM

## 2025-05-21 DIAGNOSIS — I48.0 PAROXYSMAL ATRIAL FIBRILLATION (MULTI): ICD-10-CM

## 2025-05-21 DIAGNOSIS — E11.40 DIABETIC NEUROPATHY, PAINFUL (MULTI): ICD-10-CM

## 2025-05-21 DIAGNOSIS — N18.31 CHRONIC KIDNEY DISEASE, STAGE 3A (MULTI): ICD-10-CM

## 2025-05-21 DIAGNOSIS — L03.116 CELLULITIS OF LEFT LOWER EXTREMITY: ICD-10-CM

## 2025-05-21 DIAGNOSIS — J96.01 ACUTE RESPIRATORY FAILURE WITH HYPOXIA: ICD-10-CM

## 2025-05-21 DIAGNOSIS — I50.30 HEART FAILURE WITH PRESERVED EJECTION FRACTION, UNSPECIFIED HF CHRONICITY: ICD-10-CM

## 2025-05-21 PROCEDURE — 3078F DIAST BP <80 MM HG: CPT

## 2025-05-21 PROCEDURE — 1160F RVW MEDS BY RX/DR IN RCRD: CPT

## 2025-05-21 PROCEDURE — 1159F MED LIST DOCD IN RCRD: CPT

## 2025-05-21 PROCEDURE — 3077F SYST BP >= 140 MM HG: CPT

## 2025-05-21 PROCEDURE — 99214 OFFICE O/P EST MOD 30 MIN: CPT

## 2025-05-21 PROCEDURE — 1036F TOBACCO NON-USER: CPT

## 2025-05-21 RX ORDER — CLINDAMYCIN HYDROCHLORIDE 300 MG/1
300 CAPSULE ORAL 4 TIMES DAILY
Qty: 28 CAPSULE | Refills: 0 | Status: SHIPPED | OUTPATIENT
Start: 2025-05-21 | End: 2025-05-28

## 2025-05-21 RX ORDER — CALCIUM CARBONATE 160(400)MG
TABLET,CHEWABLE ORAL
Qty: 1 EACH | Refills: 0 | Status: SHIPPED | OUTPATIENT
Start: 2025-05-21

## 2025-05-21 ASSESSMENT — ENCOUNTER SYMPTOMS
RECTAL PAIN: 0
FATIGUE: 0
NERVOUS/ANXIOUS: 0
TROUBLE SWALLOWING: 0
COLOR CHANGE: 1
POLYDIPSIA: 0
WEAKNESS: 0
POLYPHAGIA: 0
CHEST TIGHTNESS: 0
NUMBNESS: 0
DIAPHORESIS: 0
HEADACHES: 0
ABDOMINAL PAIN: 0
ARTHRALGIAS: 0
UNEXPECTED WEIGHT CHANGE: 0
MYALGIAS: 0
SHORTNESS OF BREATH: 0
NAUSEA: 0
DIARRHEA: 0
CHILLS: 0
DIFFICULTY URINATING: 0
PALPITATIONS: 0
WOUND: 0
FREQUENCY: 0
CONSTIPATION: 0

## 2025-05-21 NOTE — PROGRESS NOTES
"Subjective   Patient ID: Tish Linton is a 80 y.o. female who presents for 1 week (Pt is here today for 1 week follow up for infection in left leg. Also to check on her hip she reports.).    Patient presents for repeat evaluation of cellulitis, lower left extremity.    Cellulitis: Seen 1 week ago for left lower extremity swelling and erythema.  Prescribed clindamycin due to other medication allergies.  Ultrasound showed no DVT.  Patient has recent greater trochanter fracture on the left side.  Has still been ambulatory with walker.  After 1 week of clindamycin use erythema and edema are greatly reduced however still present.  Will extend treatment for 1 more week.  She will call office if not resolved next week.    Greater trochanter fracture: Has been using a rollator walker that used to belong to her spouse.  The walker is in poor condition and broken, hard to use.  New prescription placed for ambulatory aid.         Review of Systems   Constitutional:  Negative for chills, diaphoresis, fatigue and unexpected weight change.   HENT:  Negative for dental problem, tinnitus and trouble swallowing.    Eyes:  Negative for visual disturbance.   Respiratory:  Negative for chest tightness and shortness of breath.    Cardiovascular:  Positive for leg swelling. Negative for chest pain and palpitations.   Gastrointestinal:  Negative for abdominal pain, constipation, diarrhea, nausea and rectal pain.   Endocrine: Negative for polydipsia, polyphagia and polyuria.   Genitourinary:  Negative for difficulty urinating, frequency and urgency.   Musculoskeletal:  Negative for arthralgias and myalgias.   Skin:  Positive for color change. Negative for pallor and wound.   Neurological:  Negative for syncope, weakness, numbness and headaches.   Psychiatric/Behavioral:  Negative for suicidal ideas. The patient is not nervous/anxious.        Objective   /76   Pulse 70   Ht 1.562 m (5' 1.5\")   Wt 75.7 kg (166 lb 14.4 oz)   " SpO2 95%   BMI 31.02 kg/m²     Physical Exam  Vitals and nursing note reviewed.   Constitutional:       General: She is not in acute distress.     Appearance: Normal appearance.   HENT:      Head: Normocephalic.      Nose: Nose normal.      Mouth/Throat:      Mouth: Mucous membranes are moist.      Pharynx: Oropharynx is clear.   Eyes:      General: No scleral icterus.     Pupils: Pupils are equal, round, and reactive to light.   Neck:      Vascular: No carotid bruit.   Cardiovascular:      Rate and Rhythm: Normal rate and regular rhythm.      Pulses: Normal pulses.      Heart sounds: Normal heart sounds. No murmur heard.  Pulmonary:      Effort: Pulmonary effort is normal. No respiratory distress.      Breath sounds: Normal breath sounds. No stridor. No wheezing, rhonchi or rales.   Abdominal:      General: Bowel sounds are normal. There is no distension.      Palpations: Abdomen is soft.      Tenderness: There is no abdominal tenderness. There is no right CVA tenderness or left CVA tenderness.   Musculoskeletal:         General: No swelling. Normal range of motion.      Cervical back: Normal range of motion.      Right lower leg: No edema.      Left lower leg: Edema present.   Skin:     General: Skin is warm and dry.      Capillary Refill: Capillary refill takes less than 2 seconds.      Findings: Erythema present.   Neurological:      General: No focal deficit present.      Mental Status: She is alert and oriented to person, place, and time. Mental status is at baseline.   Psychiatric:         Mood and Affect: Mood normal.         Behavior: Behavior normal.         Thought Content: Thought content normal.         Judgment: Judgment normal.         Assessment/Plan   Diagnoses and all orders for this visit:  Closed displaced fracture of greater trochanter of left femur with routine healing, subsequent encounter  -     walker (Ultra-Light Rollator) misc; Dispense 1 Rollator/Walker. Use for ambulation  Cellulitis of  left lower extremity  -     clindamycin (Cleocin) 300 mg capsule; Take 1 capsule (300 mg) by mouth 4 times a day for 7 days.  Acute respiratory failure with hypoxia  Hypertensive heart disease with heart failure  Acute on chronic diastolic congestive heart failure  Pulmonary hypertension, unspecified (Multi)  Heart failure with preserved ejection fraction, unspecified HF chronicity  Paroxysmal atrial fibrillation (Multi)  Atrial fibrillation, unspecified type (Multi)  Diabetic neuropathy, painful (Multi)  Diabetic autonomic neuropathy associated with type 2 diabetes mellitus  Diabetic polyneuropathy associated with type 2 diabetes mellitus  Chronic kidney disease, stage 3a (Multi)         Patient was identified as a fall risk. Risk prevention instructions provided.

## 2025-05-21 NOTE — PATIENT INSTRUCTIONS

## 2025-05-23 ENCOUNTER — APPOINTMENT (OUTPATIENT)
Dept: RADIOLOGY | Facility: HOSPITAL | Age: 80
End: 2025-05-23
Payer: MEDICARE

## 2025-06-02 ENCOUNTER — ANTICOAGULATION - WARFARIN VISIT (OUTPATIENT)
Dept: PHARMACY | Facility: HOSPITAL | Age: 80
End: 2025-06-02
Payer: MEDICARE

## 2025-06-02 DIAGNOSIS — I48.11 LONGSTANDING PERSISTENT ATRIAL FIBRILLATION (MULTI): Primary | ICD-10-CM

## 2025-06-02 LAB
POC INR: 1.7 (ref 0.9–1.1)
POC PROTHROMBIN TIME: ABNORMAL (ref 9.3–12.5)

## 2025-06-02 PROCEDURE — 99211 OFF/OP EST MAY X REQ PHY/QHP: CPT | Performed by: PHARMACIST

## 2025-06-02 PROCEDURE — 85610 PROTHROMBIN TIME: CPT | Mod: QW

## 2025-06-02 NOTE — PROGRESS NOTES
Pt enrolled in anticoag clinic for management of Longstanding persistent atrial fibrillation (Multi) [I48.11].     Pt current location in clinic.     Current anticoagulant: Warfarin    Time since last visit: 2 weeks    Last INR: 2.6 on warfarin 15 mg in the previous week. Pt was instructed to start 17.5 mg weekly at last visit.    Last Creatinine:   Lab Results   Component Value Date    CREATININE 0.96 12/03/2024     Last hemoglobin/hematocrit:  Lab Results   Component Value Date    HGB 13.4 12/03/2024     Lab Results   Component Value Date    HCT 43.1 12/03/2024       Current INR: 1.7 is SUBtherapeutic for goal range of 2.0-3.0 and is reflective of 17.5 mg in the previous week prior to visit.    Dosing confirmed with patient  Patient denies any missed doses.  Patient denies any diet changes, or OTC/herbal supplement changes since last visit - was on clinda for cellulitis, finished a couple of days ago.   Patient denies any adverse reactions or barriers.  Patient denies any CP/SOB, fatigue, bleeding or bruising since last visit.   Patient denies any change in alcohol or tobacco use since last visit.   Patient denies any upcoming medical or dental procedures.    Plan:  Patient was instructed to stop warfarin and will start Pradaxa today or tomorrow depending on her pharmacy.  I will resolve anticoag episode for now  Patient was instructed to maintain consistent vegetable intake, to monitor for any bruising or bleeding, and to call with any medication changes or concerns.    Pt handout given with above information    Oleg Cook, PharmD  P:[phone]  F:[phone]

## 2025-06-04 ENCOUNTER — TELEPHONE (OUTPATIENT)
Dept: PRIMARY CARE | Facility: CLINIC | Age: 80
End: 2025-06-04
Payer: MEDICARE

## 2025-06-04 DIAGNOSIS — S72.112S CLOSED DISPLACED FRACTURE OF GREATER TROCHANTER OF LEFT FEMUR, SEQUELA: Primary | ICD-10-CM

## 2025-06-04 NOTE — TELEPHONE ENCOUNTER
The  from pt insurance called in and stated after working with the pt they found out where her order for a new rollator needed to be sent. It needs to go to [facility] and the number for them is [phone]. Does not have the fax number. States pt reports she spoke to you about this order.

## 2025-07-07 ENCOUNTER — TELEPHONE (OUTPATIENT)
Dept: PRIMARY CARE | Facility: CLINIC | Age: 80
End: 2025-07-07
Payer: MEDICARE

## 2025-07-07 DIAGNOSIS — I48.0 PAROXYSMAL ATRIAL FIBRILLATION (MULTI): ICD-10-CM

## 2025-07-08 RX ORDER — METOPROLOL TARTRATE 75 MG/1
75 TABLET ORAL 2 TIMES DAILY
Qty: 180 TABLET | Refills: 1 | Status: SHIPPED | OUTPATIENT
Start: 2025-07-08

## 2025-09-02 ENCOUNTER — TELEPHONE (OUTPATIENT)
Dept: PRIMARY CARE | Facility: CLINIC | Age: 80
End: 2025-09-02
Payer: MEDICARE

## 2025-09-02 DIAGNOSIS — J45.909 ASTHMA, UNSPECIFIED ASTHMA SEVERITY, UNSPECIFIED WHETHER COMPLICATED, UNSPECIFIED WHETHER PERSISTENT (HHS-HCC): ICD-10-CM

## 2025-09-02 RX ORDER — MONTELUKAST SODIUM 10 MG/1
10 TABLET ORAL NIGHTLY
Qty: 90 TABLET | Refills: 3 | Status: SHIPPED | OUTPATIENT
Start: 2025-09-02 | End: 2026-09-02

## 2025-11-14 ENCOUNTER — APPOINTMENT (OUTPATIENT)
Dept: PRIMARY CARE | Facility: CLINIC | Age: 80
End: 2025-11-14
Payer: MEDICARE

## 2026-05-14 ENCOUNTER — APPOINTMENT (OUTPATIENT)
Dept: PRIMARY CARE | Facility: CLINIC | Age: 81
End: 2026-05-14
Payer: MEDICARE